# Patient Record
Sex: FEMALE | Race: WHITE | NOT HISPANIC OR LATINO
[De-identification: names, ages, dates, MRNs, and addresses within clinical notes are randomized per-mention and may not be internally consistent; named-entity substitution may affect disease eponyms.]

---

## 2017-11-07 ENCOUNTER — TRANSCRIPTION ENCOUNTER (OUTPATIENT)
Age: 27
End: 2017-11-07

## 2018-01-17 ENCOUNTER — RESULT REVIEW (OUTPATIENT)
Age: 28
End: 2018-01-17

## 2018-04-25 ENCOUNTER — ASOB RESULT (OUTPATIENT)
Age: 28
End: 2018-04-25

## 2018-04-25 ENCOUNTER — APPOINTMENT (OUTPATIENT)
Dept: ANTEPARTUM | Facility: CLINIC | Age: 28
End: 2018-04-25
Payer: COMMERCIAL

## 2018-04-25 PROCEDURE — 76817 TRANSVAGINAL US OBSTETRIC: CPT

## 2018-04-25 PROCEDURE — 76805 OB US >/= 14 WKS SNGL FETUS: CPT

## 2018-08-26 ENCOUNTER — OUTPATIENT (OUTPATIENT)
Dept: OUTPATIENT SERVICES | Facility: HOSPITAL | Age: 28
LOS: 1 days | End: 2018-08-26
Payer: COMMERCIAL

## 2018-08-26 DIAGNOSIS — O26.899 OTHER SPECIFIED PREGNANCY RELATED CONDITIONS, UNSPECIFIED TRIMESTER: ICD-10-CM

## 2018-08-26 DIAGNOSIS — Z3A.00 WEEKS OF GESTATION OF PREGNANCY NOT SPECIFIED: ICD-10-CM

## 2018-08-26 PROCEDURE — G0463: CPT

## 2018-08-26 PROCEDURE — 59025 FETAL NON-STRESS TEST: CPT

## 2018-08-30 ENCOUNTER — TRANSCRIPTION ENCOUNTER (OUTPATIENT)
Age: 28
End: 2018-08-30

## 2018-08-30 ENCOUNTER — INPATIENT (INPATIENT)
Facility: HOSPITAL | Age: 28
LOS: 0 days | Discharge: HOME | End: 2018-08-31
Attending: OBSTETRICS & GYNECOLOGY | Admitting: OBSTETRICS & GYNECOLOGY
Payer: COMMERCIAL

## 2018-08-30 VITALS — WEIGHT: 153 LBS | HEIGHT: 66 IN

## 2018-08-30 DIAGNOSIS — Z3A.00 WEEKS OF GESTATION OF PREGNANCY NOT SPECIFIED: ICD-10-CM

## 2018-08-30 DIAGNOSIS — O26.899 OTHER SPECIFIED PREGNANCY RELATED CONDITIONS, UNSPECIFIED TRIMESTER: ICD-10-CM

## 2018-08-30 DIAGNOSIS — Z34.80 ENCOUNTER FOR SUPERVISION OF OTHER NORMAL PREGNANCY, UNSPECIFIED TRIMESTER: ICD-10-CM

## 2018-08-30 LAB
ANTIBODY ID 1_1: SIGNIFICANT CHANGE UP
BASOPHILS # BLD AUTO: 0 K/UL — SIGNIFICANT CHANGE UP (ref 0–0.2)
BASOPHILS NFR BLD AUTO: 0.6 % — SIGNIFICANT CHANGE UP (ref 0–2)
BLD GP AB SCN SERPL QL: POSITIVE — SIGNIFICANT CHANGE UP
EOSINOPHIL # BLD AUTO: 0.1 K/UL — SIGNIFICANT CHANGE UP (ref 0–0.5)
EOSINOPHIL NFR BLD AUTO: 1.9 % — SIGNIFICANT CHANGE UP (ref 0–6)
HCT VFR BLD CALC: 40.4 % — SIGNIFICANT CHANGE UP (ref 34.5–45)
HGB BLD-MCNC: 13.4 G/DL — SIGNIFICANT CHANGE UP (ref 11.5–15.5)
LYMPHOCYTES # BLD AUTO: 1.9 K/UL — SIGNIFICANT CHANGE UP (ref 1–3.3)
LYMPHOCYTES # BLD AUTO: 27.9 % — SIGNIFICANT CHANGE UP (ref 13–44)
MCHC RBC-ENTMCNC: 31.6 PG — SIGNIFICANT CHANGE UP (ref 27–34)
MCHC RBC-ENTMCNC: 33.1 GM/DL — SIGNIFICANT CHANGE UP (ref 32–36)
MCV RBC AUTO: 95.4 FL — SIGNIFICANT CHANGE UP (ref 80–100)
MONOCYTES # BLD AUTO: 0.7 K/UL — SIGNIFICANT CHANGE UP (ref 0–0.9)
MONOCYTES NFR BLD AUTO: 9.4 % — SIGNIFICANT CHANGE UP (ref 2–14)
NEUTROPHILS # BLD AUTO: 4.2 K/UL — SIGNIFICANT CHANGE UP (ref 1.8–7.4)
NEUTROPHILS NFR BLD AUTO: 60.2 % — SIGNIFICANT CHANGE UP (ref 43–77)
PLATELET # BLD AUTO: 166 K/UL — SIGNIFICANT CHANGE UP (ref 150–400)
RBC # BLD: 4.23 M/UL — SIGNIFICANT CHANGE UP (ref 3.8–5.2)
RBC # FLD: 11.8 % — SIGNIFICANT CHANGE UP (ref 10.3–14.5)
RH IG SCN BLD-IMP: NEGATIVE — SIGNIFICANT CHANGE UP
T PALLIDUM AB TITR SER: NEGATIVE — SIGNIFICANT CHANGE UP
WBC # BLD: 7 K/UL — SIGNIFICANT CHANGE UP (ref 3.8–10.5)
WBC # FLD AUTO: 7 K/UL — SIGNIFICANT CHANGE UP (ref 3.8–10.5)

## 2018-08-30 PROCEDURE — 86077 PHYS BLOOD BANK SERV XMATCH: CPT

## 2018-08-30 RX ORDER — MAGNESIUM HYDROXIDE 400 MG/1
30 TABLET, CHEWABLE ORAL
Qty: 0 | Refills: 0 | Status: DISCONTINUED | OUTPATIENT
Start: 2018-08-30 | End: 2018-08-31

## 2018-08-30 RX ORDER — SODIUM CHLORIDE 9 MG/ML
3 INJECTION INTRAMUSCULAR; INTRAVENOUS; SUBCUTANEOUS EVERY 8 HOURS
Qty: 0 | Refills: 0 | Status: DISCONTINUED | OUTPATIENT
Start: 2018-08-30 | End: 2018-08-30

## 2018-08-30 RX ORDER — OXYCODONE HYDROCHLORIDE 5 MG/1
5 TABLET ORAL EVERY 4 HOURS
Qty: 0 | Refills: 0 | Status: DISCONTINUED | OUTPATIENT
Start: 2018-08-30 | End: 2018-08-31

## 2018-08-30 RX ORDER — OXYTOCIN 10 UNIT/ML
41.67 VIAL (ML) INJECTION
Qty: 20 | Refills: 0 | Status: DISCONTINUED | OUTPATIENT
Start: 2018-08-30 | End: 2018-08-31

## 2018-08-30 RX ORDER — SODIUM CHLORIDE 9 MG/ML
500 INJECTION, SOLUTION INTRAVENOUS ONCE
Qty: 0 | Refills: 0 | Status: DISCONTINUED | OUTPATIENT
Start: 2018-08-30 | End: 2018-08-30

## 2018-08-30 RX ORDER — OXYCODONE HYDROCHLORIDE 5 MG/1
5 TABLET ORAL
Qty: 0 | Refills: 0 | Status: DISCONTINUED | OUTPATIENT
Start: 2018-08-30 | End: 2018-08-31

## 2018-08-30 RX ORDER — OXYTOCIN 10 UNIT/ML
41.67 VIAL (ML) INJECTION
Qty: 20 | Refills: 0 | Status: DISCONTINUED | OUTPATIENT
Start: 2018-08-30 | End: 2018-08-30

## 2018-08-30 RX ORDER — SIMETHICONE 80 MG/1
80 TABLET, CHEWABLE ORAL EVERY 6 HOURS
Qty: 0 | Refills: 0 | Status: DISCONTINUED | OUTPATIENT
Start: 2018-08-30 | End: 2018-08-31

## 2018-08-30 RX ORDER — PRAMOXINE HYDROCHLORIDE 150 MG/15G
1 AEROSOL, FOAM RECTAL EVERY 4 HOURS
Qty: 0 | Refills: 0 | Status: DISCONTINUED | OUTPATIENT
Start: 2018-08-30 | End: 2018-08-30

## 2018-08-30 RX ORDER — OXYTOCIN 10 UNIT/ML
333.33 VIAL (ML) INJECTION
Qty: 20 | Refills: 0 | Status: COMPLETED | OUTPATIENT
Start: 2018-08-30

## 2018-08-30 RX ORDER — OXYTOCIN 10 UNIT/ML
333.33 VIAL (ML) INJECTION
Qty: 20 | Refills: 0 | Status: COMPLETED | OUTPATIENT
Start: 2018-08-30 | End: 2018-08-30

## 2018-08-30 RX ORDER — DIBUCAINE 1 %
1 OINTMENT (GRAM) RECTAL EVERY 4 HOURS
Qty: 0 | Refills: 0 | Status: DISCONTINUED | OUTPATIENT
Start: 2018-08-30 | End: 2018-08-30

## 2018-08-30 RX ORDER — DIPHENHYDRAMINE HCL 50 MG
25 CAPSULE ORAL EVERY 6 HOURS
Qty: 0 | Refills: 0 | Status: DISCONTINUED | OUTPATIENT
Start: 2018-08-30 | End: 2018-08-31

## 2018-08-30 RX ORDER — KETOROLAC TROMETHAMINE 30 MG/ML
30 SYRINGE (ML) INJECTION ONCE
Qty: 0 | Refills: 0 | Status: DISCONTINUED | OUTPATIENT
Start: 2018-08-30 | End: 2018-08-30

## 2018-08-30 RX ORDER — GLYCERIN ADULT
1 SUPPOSITORY, RECTAL RECTAL AT BEDTIME
Qty: 0 | Refills: 0 | Status: DISCONTINUED | OUTPATIENT
Start: 2018-08-30 | End: 2018-08-31

## 2018-08-30 RX ORDER — ACETAMINOPHEN 500 MG
975 TABLET ORAL EVERY 6 HOURS
Qty: 0 | Refills: 0 | Status: COMPLETED | OUTPATIENT
Start: 2018-08-30 | End: 2019-07-29

## 2018-08-30 RX ORDER — ACETAMINOPHEN 500 MG
975 TABLET ORAL EVERY 6 HOURS
Qty: 0 | Refills: 0 | Status: DISCONTINUED | OUTPATIENT
Start: 2018-08-30 | End: 2018-08-31

## 2018-08-30 RX ORDER — AER TRAVELER 0.5 G/1
1 SOLUTION RECTAL; TOPICAL EVERY 4 HOURS
Qty: 0 | Refills: 0 | Status: DISCONTINUED | OUTPATIENT
Start: 2018-08-30 | End: 2018-08-31

## 2018-08-30 RX ORDER — LANOLIN
1 OINTMENT (GRAM) TOPICAL EVERY 6 HOURS
Qty: 0 | Refills: 0 | Status: DISCONTINUED | OUTPATIENT
Start: 2018-08-30 | End: 2018-08-31

## 2018-08-30 RX ORDER — DIBUCAINE 1 %
1 OINTMENT (GRAM) RECTAL EVERY 4 HOURS
Qty: 0 | Refills: 0 | Status: DISCONTINUED | OUTPATIENT
Start: 2018-08-30 | End: 2018-08-31

## 2018-08-30 RX ORDER — SODIUM CHLORIDE 9 MG/ML
3 INJECTION INTRAMUSCULAR; INTRAVENOUS; SUBCUTANEOUS EVERY 8 HOURS
Qty: 0 | Refills: 0 | Status: DISCONTINUED | OUTPATIENT
Start: 2018-08-30 | End: 2018-08-31

## 2018-08-30 RX ORDER — PRAMOXINE HYDROCHLORIDE 150 MG/15G
1 AEROSOL, FOAM RECTAL EVERY 4 HOURS
Qty: 0 | Refills: 0 | Status: DISCONTINUED | OUTPATIENT
Start: 2018-08-30 | End: 2018-08-31

## 2018-08-30 RX ORDER — IBUPROFEN 200 MG
600 TABLET ORAL EVERY 6 HOURS
Qty: 0 | Refills: 0 | Status: COMPLETED | OUTPATIENT
Start: 2018-08-30 | End: 2019-07-29

## 2018-08-30 RX ORDER — DOCUSATE SODIUM 100 MG
100 CAPSULE ORAL
Qty: 0 | Refills: 0 | Status: DISCONTINUED | OUTPATIENT
Start: 2018-08-30 | End: 2018-08-31

## 2018-08-30 RX ORDER — SODIUM CHLORIDE 9 MG/ML
1000 INJECTION, SOLUTION INTRAVENOUS
Qty: 0 | Refills: 0 | Status: DISCONTINUED | OUTPATIENT
Start: 2018-08-30 | End: 2018-08-30

## 2018-08-30 RX ORDER — HYDROCORTISONE 1 %
1 OINTMENT (GRAM) TOPICAL EVERY 4 HOURS
Qty: 0 | Refills: 0 | Status: DISCONTINUED | OUTPATIENT
Start: 2018-08-30 | End: 2018-08-31

## 2018-08-30 RX ORDER — AER TRAVELER 0.5 G/1
1 SOLUTION RECTAL; TOPICAL EVERY 4 HOURS
Qty: 0 | Refills: 0 | Status: DISCONTINUED | OUTPATIENT
Start: 2018-08-30 | End: 2018-08-30

## 2018-08-30 RX ORDER — TETANUS TOXOID, REDUCED DIPHTHERIA TOXOID AND ACELLULAR PERTUSSIS VACCINE, ADSORBED 5; 2.5; 8; 8; 2.5 [IU]/.5ML; [IU]/.5ML; UG/.5ML; UG/.5ML; UG/.5ML
0.5 SUSPENSION INTRAMUSCULAR ONCE
Qty: 0 | Refills: 0 | Status: DISCONTINUED | OUTPATIENT
Start: 2018-08-30 | End: 2018-08-31

## 2018-08-30 RX ORDER — HYDROCORTISONE 1 %
1 OINTMENT (GRAM) TOPICAL EVERY 4 HOURS
Qty: 0 | Refills: 0 | Status: DISCONTINUED | OUTPATIENT
Start: 2018-08-30 | End: 2018-08-30

## 2018-08-30 RX ORDER — IBUPROFEN 200 MG
600 TABLET ORAL EVERY 6 HOURS
Qty: 0 | Refills: 0 | Status: DISCONTINUED | OUTPATIENT
Start: 2018-08-30 | End: 2018-08-31

## 2018-08-30 RX ORDER — CITRIC ACID/SODIUM CITRATE 300-500 MG
15 SOLUTION, ORAL ORAL EVERY 4 HOURS
Qty: 0 | Refills: 0 | Status: DISCONTINUED | OUTPATIENT
Start: 2018-08-30 | End: 2018-08-30

## 2018-08-30 RX ADMIN — SODIUM CHLORIDE 3 MILLILITER(S): 9 INJECTION INTRAMUSCULAR; INTRAVENOUS; SUBCUTANEOUS at 22:00

## 2018-08-30 RX ADMIN — Medication 125 MILLIUNIT(S)/MIN: at 07:02

## 2018-08-30 RX ADMIN — Medication 30 MILLIGRAM(S): at 07:00

## 2018-08-30 RX ADMIN — Medication 1000 MILLIUNIT(S)/MIN: at 07:02

## 2018-08-30 NOTE — DISCHARGE NOTE OB - PATIENT PORTAL LINK FT
You can access the [facility] Patient [facility], offered by [facility], by registering with the following website: [email or web address]

## 2018-08-30 NOTE — DISCHARGE NOTE OB - CARE PROVIDER_API CALL
Meghan Che), Obstetrics and Gynecology  [address]  [address]  [place] [address]  Phone: [phone]  Fax: [phone]

## 2018-08-31 VITALS
SYSTOLIC BLOOD PRESSURE: 103 MMHG | HEART RATE: 71 BPM | TEMPERATURE: 98 F | OXYGEN SATURATION: 98 % | RESPIRATION RATE: 17 BRPM | DIASTOLIC BLOOD PRESSURE: 68 MMHG

## 2018-08-31 LAB
HCT VFR BLD CALC: 41.5 % — SIGNIFICANT CHANGE UP (ref 34.5–45)
HGB BLD-MCNC: 13.5 G/DL — SIGNIFICANT CHANGE UP (ref 11.5–15.5)
KLEIHAUER-BETKE CALCULATION: 0 % — SIGNIFICANT CHANGE UP (ref 0–0.3)

## 2018-08-31 PROCEDURE — 86870 RBC ANTIBODY IDENTIFICATION: CPT

## 2018-08-31 PROCEDURE — 59050 FETAL MONITOR W/REPORT: CPT

## 2018-08-31 PROCEDURE — 86850 RBC ANTIBODY SCREEN: CPT

## 2018-08-31 PROCEDURE — 85014 HEMATOCRIT: CPT

## 2018-08-31 PROCEDURE — 85027 COMPLETE CBC AUTOMATED: CPT

## 2018-08-31 PROCEDURE — G0463: CPT

## 2018-08-31 PROCEDURE — 59025 FETAL NON-STRESS TEST: CPT

## 2018-08-31 PROCEDURE — 86780 TREPONEMA PALLIDUM: CPT

## 2018-08-31 PROCEDURE — 85018 HEMOGLOBIN: CPT

## 2018-08-31 PROCEDURE — 86900 BLOOD TYPING SEROLOGIC ABO: CPT

## 2018-08-31 PROCEDURE — 86901 BLOOD TYPING SEROLOGIC RH(D): CPT

## 2018-08-31 PROCEDURE — 85460 HEMOGLOBIN FETAL: CPT

## 2018-08-31 NOTE — PROGRESS NOTE ADULT - PROBLEM SELECTOR PLAN 1
- Pain well controlled, continue current pain regimen  - Increase ambulation  - Continue regular diet  - Discharge planning. Pt would like to be home to observe the Marcio

## 2018-08-31 NOTE — PROGRESS NOTE ADULT - SUBJECTIVE AND OBJECTIVE BOX
OB Postpartum Note - Vaginal Delivery  Patient is 28y s/p  PP day 1    Subjective:  Patient seen and examined at bedside. No acute overnight events. No acute complaints, pain well controlled. Epidural still in place. Anesthesia to remove this AM. Patient is ambulating, voiding spontaneously, passing gas, and tolerating regular diet. Patient denies SOB, CP, N/V, leg pain.     Objective:  Vital Signs Last 24 Hours  T(C): 36.8 (18 @ 05:37), Max: 37.1 (18 @ 13:31)  HR: 61 (18 @ 05:37) (56 - 96)  BP: 99/62 (18 @ 05:37) (90/60 - 101/66)  RR: 18 (18 @ 05:37) (16 - 18)  SpO2: 97% (18 @ 05:37) (95% - 97%)    Physical Exam:  General: NAD  Pulm: Clear to auscultation bilaterally  Cardio: Normal rate and regular rhythm  Abdomen: Soft, non-tender, non-distended, firm uterine fundus   Pelvic: Lochia wnl  Ext: No edema, No erythema, Non-tender    Labs:  Blood Type: O Negative  Antibody Screen: Positive  RPR: Negative                            13.4   7.0   )-----------( 166      ( 30 Aug 2018 04:36 )             40.4         MEDICATIONS  (STANDING):  acetaminophen   Tablet. 975 milliGRAM(s) Oral every 6 hours  diphtheria/tetanus/pertussis (acellular) Vaccine (ADAcel) 0.5 milliLiter(s) IntraMuscular once  ibuprofen  Tablet 600 milliGRAM(s) Oral every 6 hours  oxyCODONE    IR 5 milliGRAM(s) Oral every 3 hours  oxytocin Infusion 41.667 milliUNIT(s)/Min (125 mL/Hr) IV Continuous <Continuous>  prenatal multivitamin 1 Tablet(s) Oral daily  sodium chloride 0.9% lock flush 3 milliLiter(s) IV Push every 8 hours    MEDICATIONS  (PRN):  dibucaine 1% Ointment 1 Application(s) Topical every 4 hours PRN Perineal Discomfort  diphenhydrAMINE   Capsule 25 milliGRAM(s) Oral every 6 hours PRN Itching  docusate sodium 100 milliGRAM(s) Oral two times a day PRN Stool Softening  glycerin Suppository - Adult 1 Suppository(s) Rectal at bedtime PRN Constipation  hydrocortisone 1% Cream 1 Application(s) Topical every 4 hours PRN Moderate to Severe Perineal Pain  lanolin Ointment 1 Application(s) Topical every 6 hours PRN Sore Nipples  magnesium hydroxide Suspension 30 milliLiter(s) Oral two times a day PRN Constipation  oxyCODONE    IR 5 milliGRAM(s) Oral every 4 hours PRN Severe Pain (7 -10)  pramoxine 1%/zinc 5% Cream 1 Application(s) Topical every 4 hours PRN Moderate to Severe Perineal Pain  simethicone 80 milliGRAM(s) Chew every 6 hours PRN Gas  witch hazel Pads 1 Application(s) Topical every 4 hours PRN Perineal Discomfort    Vonnie Baumann, PGY-1
Vital Signs Last 24 Hrs  T(C): 37 (30 Aug 2018 17:49), Max: 37.1 (30 Aug 2018 13:31)  T(F): 98.6 (30 Aug 2018 17:49), Max: 98.8 (30 Aug 2018 13:31)  HR: 67 (30 Aug 2018 17:49) (56 - 96)  BP: 101/64 (30 Aug 2018 17:49) (90/60 - 109/72)  BP(mean): --  RR: 18 (30 Aug 2018 17:49) (16 - 19)  SpO2: 97% (30 Aug 2018 13:31) (95% - 100%)    Abdomen soft  Fundus Firm  Lochia WNL  Voiding  Stable

## 2018-11-13 ENCOUNTER — TRANSCRIPTION ENCOUNTER (OUTPATIENT)
Age: 28
End: 2018-11-13

## 2019-04-18 NOTE — PATIENT PROFILE OB - FUNCTIONAL SCREEN CURRENT LEVEL: DRESSING, MLM
"Subjective:       Patient ID: Brigette Dunn is a 49 y.o. female.    Vitals:  height is 5' 4" (1.626 m) and weight is 59.9 kg (132 lb). Her oral temperature is 98 °F (36.7 °C). Her blood pressure is 123/80 and her pulse is 93. Her oxygen saturation is 100%.     Chief Complaint: Urinary Tract Infection    Urinary Tract Infection    This is a new problem. The current episode started in the past 7 days (3 days). The problem has been unchanged. The quality of the pain is described as burning. The pain is at a severity of 6/10. The pain is moderate. There has been no fever. The fever has been present for less than 1 day. She is sexually active. There is no history of pyelonephritis. Associated symptoms include frequency and urgency. Pertinent negatives include no chills, hematuria, nausea, vomiting or rash. She has tried nothing for the symptoms.       Constitution: Negative for chills and fever.   Neck: Negative for painful lymph nodes.   Gastrointestinal: Negative for abdominal pain, nausea and vomiting.   Genitourinary: Positive for dysuria, frequency and urgency. Negative for urine decreased, hematuria, history of kidney stones, painful menstruation, irregular menstruation, missed menses, heavy menstrual bleeding, ovarian cysts, genital trauma, vaginal pain, vaginal discharge, vaginal bleeding, vaginal odor, painful intercourse, genital sore, painful ejaculation and pelvic pain.   Musculoskeletal: Negative for back pain.   Skin: Negative for rash and lesion.   Hematologic/Lymphatic: Negative for swollen lymph nodes.       Objective:      Physical Exam   Constitutional: She appears well-developed and well-nourished.   Eyes: Pupils are equal, round, and reactive to light. Conjunctivae and EOM are normal.   Abdominal: Soft. Bowel sounds are normal.   Nursing note and vitals reviewed.      Assessment:       1. Dysuria    2. Acute UTI        Plan:         Dysuria  -     POCT Urinalysis, Dipstick, Manual, w/o Scope    Acute " UTI  -     nitrofurantoin, macrocrystal-monohydrate, (MACROBID) 100 MG capsule; Take 1 capsule (100 mg total) by mouth 2 (two) times daily. for 7 days  Dispense: 14 capsule; Refill: 0  -     Culture, Urine          (0) independent

## 2019-09-23 ENCOUNTER — TRANSCRIPTION ENCOUNTER (OUTPATIENT)
Age: 29
End: 2019-09-23

## 2019-10-02 ENCOUNTER — TRANSCRIPTION ENCOUNTER (OUTPATIENT)
Age: 29
End: 2019-10-02

## 2019-12-10 ENCOUNTER — RESULT REVIEW (OUTPATIENT)
Age: 29
End: 2019-12-10

## 2020-02-25 ENCOUNTER — APPOINTMENT (OUTPATIENT)
Dept: ANTEPARTUM | Facility: CLINIC | Age: 30
End: 2020-02-25
Payer: COMMERCIAL

## 2020-02-25 ENCOUNTER — ASOB RESULT (OUTPATIENT)
Age: 30
End: 2020-02-25

## 2020-02-25 PROCEDURE — 76805 OB US >/= 14 WKS SNGL FETUS: CPT

## 2020-07-16 ENCOUNTER — INPATIENT (INPATIENT)
Facility: HOSPITAL | Age: 30
LOS: 1 days | Discharge: HOME | End: 2020-07-18
Attending: OBSTETRICS & GYNECOLOGY | Admitting: OBSTETRICS & GYNECOLOGY
Payer: COMMERCIAL

## 2020-07-16 VITALS
DIASTOLIC BLOOD PRESSURE: 75 MMHG | OXYGEN SATURATION: 98 % | SYSTOLIC BLOOD PRESSURE: 111 MMHG | HEART RATE: 65 BPM | RESPIRATION RATE: 17 BRPM | TEMPERATURE: 98 F

## 2020-07-16 DIAGNOSIS — Z3A.00 WEEKS OF GESTATION OF PREGNANCY NOT SPECIFIED: ICD-10-CM

## 2020-07-16 DIAGNOSIS — Z34.80 ENCOUNTER FOR SUPERVISION OF OTHER NORMAL PREGNANCY, UNSPECIFIED TRIMESTER: ICD-10-CM

## 2020-07-16 DIAGNOSIS — O26.899 OTHER SPECIFIED PREGNANCY RELATED CONDITIONS, UNSPECIFIED TRIMESTER: ICD-10-CM

## 2020-07-16 LAB
ANTIBODY ID 1_1: SIGNIFICANT CHANGE UP
BASOPHILS # BLD AUTO: 0.02 K/UL — SIGNIFICANT CHANGE UP (ref 0–0.2)
BASOPHILS NFR BLD AUTO: 0.3 % — SIGNIFICANT CHANGE UP (ref 0–2)
BLD GP AB SCN SERPL QL: POSITIVE — SIGNIFICANT CHANGE UP
EOSINOPHIL # BLD AUTO: 0.14 K/UL — SIGNIFICANT CHANGE UP (ref 0–0.5)
EOSINOPHIL NFR BLD AUTO: 2 % — SIGNIFICANT CHANGE UP (ref 0–6)
HCT VFR BLD CALC: 44 % — SIGNIFICANT CHANGE UP (ref 34.5–45)
HGB BLD-MCNC: 14.2 G/DL — SIGNIFICANT CHANGE UP (ref 11.5–15.5)
IMM GRANULOCYTES NFR BLD AUTO: 0.4 % — SIGNIFICANT CHANGE UP (ref 0–1.5)
LYMPHOCYTES # BLD AUTO: 1.6 K/UL — SIGNIFICANT CHANGE UP (ref 1–3.3)
LYMPHOCYTES # BLD AUTO: 22.7 % — SIGNIFICANT CHANGE UP (ref 13–44)
MCHC RBC-ENTMCNC: 31.4 PG — SIGNIFICANT CHANGE UP (ref 27–34)
MCHC RBC-ENTMCNC: 32.3 GM/DL — SIGNIFICANT CHANGE UP (ref 32–36)
MCV RBC AUTO: 97.3 FL — SIGNIFICANT CHANGE UP (ref 80–100)
MONOCYTES # BLD AUTO: 0.5 K/UL — SIGNIFICANT CHANGE UP (ref 0–0.9)
MONOCYTES NFR BLD AUTO: 7.1 % — SIGNIFICANT CHANGE UP (ref 2–14)
NEUTROPHILS # BLD AUTO: 4.75 K/UL — SIGNIFICANT CHANGE UP (ref 1.8–7.4)
NEUTROPHILS NFR BLD AUTO: 67.5 % — SIGNIFICANT CHANGE UP (ref 43–77)
NRBC # BLD: 0 /100 WBCS — SIGNIFICANT CHANGE UP (ref 0–0)
PLATELET # BLD AUTO: 165 K/UL — SIGNIFICANT CHANGE UP (ref 150–400)
RBC # BLD: 4.52 M/UL — SIGNIFICANT CHANGE UP (ref 3.8–5.2)
RBC # FLD: 12.8 % — SIGNIFICANT CHANGE UP (ref 10.3–14.5)
RH IG SCN BLD-IMP: NEGATIVE — SIGNIFICANT CHANGE UP
SARS-COV-2 RNA SPEC QL NAA+PROBE: SIGNIFICANT CHANGE UP
WBC # BLD: 7.04 K/UL — SIGNIFICANT CHANGE UP (ref 3.8–10.5)
WBC # FLD AUTO: 7.04 K/UL — SIGNIFICANT CHANGE UP (ref 3.8–10.5)

## 2020-07-16 PROCEDURE — 86077 PHYS BLOOD BANK SERV XMATCH: CPT

## 2020-07-16 RX ORDER — PRAMOXINE HYDROCHLORIDE 150 MG/15G
1 AEROSOL, FOAM RECTAL EVERY 4 HOURS
Refills: 0 | Status: DISCONTINUED | OUTPATIENT
Start: 2020-07-16 | End: 2020-07-18

## 2020-07-16 RX ORDER — CITRIC ACID/SODIUM CITRATE 300-500 MG
15 SOLUTION, ORAL ORAL EVERY 6 HOURS
Refills: 0 | Status: DISCONTINUED | OUTPATIENT
Start: 2020-07-16 | End: 2020-07-17

## 2020-07-16 RX ORDER — DIBUCAINE 1 %
1 OINTMENT (GRAM) RECTAL EVERY 6 HOURS
Refills: 0 | Status: DISCONTINUED | OUTPATIENT
Start: 2020-07-16 | End: 2020-07-18

## 2020-07-16 RX ORDER — IBUPROFEN 200 MG
600 TABLET ORAL EVERY 6 HOURS
Refills: 0 | Status: COMPLETED | OUTPATIENT
Start: 2020-07-16 | End: 2021-06-14

## 2020-07-16 RX ORDER — SODIUM CHLORIDE 9 MG/ML
1000 INJECTION, SOLUTION INTRAVENOUS
Refills: 0 | Status: DISCONTINUED | OUTPATIENT
Start: 2020-07-16 | End: 2020-07-17

## 2020-07-16 RX ORDER — LANOLIN
1 OINTMENT (GRAM) TOPICAL EVERY 6 HOURS
Refills: 0 | Status: DISCONTINUED | OUTPATIENT
Start: 2020-07-16 | End: 2020-07-18

## 2020-07-16 RX ORDER — SIMETHICONE 80 MG/1
80 TABLET, CHEWABLE ORAL EVERY 4 HOURS
Refills: 0 | Status: DISCONTINUED | OUTPATIENT
Start: 2020-07-16 | End: 2020-07-18

## 2020-07-16 RX ORDER — TETANUS TOXOID, REDUCED DIPHTHERIA TOXOID AND ACELLULAR PERTUSSIS VACCINE, ADSORBED 5; 2.5; 8; 8; 2.5 [IU]/.5ML; [IU]/.5ML; UG/.5ML; UG/.5ML; UG/.5ML
0.5 SUSPENSION INTRAMUSCULAR ONCE
Refills: 0 | Status: DISCONTINUED | OUTPATIENT
Start: 2020-07-16 | End: 2020-07-18

## 2020-07-16 RX ORDER — OXYCODONE HYDROCHLORIDE 5 MG/1
5 TABLET ORAL
Refills: 0 | Status: DISCONTINUED | OUTPATIENT
Start: 2020-07-16 | End: 2020-07-18

## 2020-07-16 RX ORDER — AER TRAVELER 0.5 G/1
1 SOLUTION RECTAL; TOPICAL EVERY 4 HOURS
Refills: 0 | Status: DISCONTINUED | OUTPATIENT
Start: 2020-07-16 | End: 2020-07-18

## 2020-07-16 RX ORDER — MAGNESIUM HYDROXIDE 400 MG/1
30 TABLET, CHEWABLE ORAL
Refills: 0 | Status: DISCONTINUED | OUTPATIENT
Start: 2020-07-16 | End: 2020-07-18

## 2020-07-16 RX ORDER — OXYTOCIN 10 UNIT/ML
333.33 VIAL (ML) INJECTION
Qty: 20 | Refills: 0 | Status: DISCONTINUED | OUTPATIENT
Start: 2020-07-16 | End: 2020-07-18

## 2020-07-16 RX ORDER — ACETAMINOPHEN 500 MG
975 TABLET ORAL
Refills: 0 | Status: DISCONTINUED | OUTPATIENT
Start: 2020-07-16 | End: 2020-07-18

## 2020-07-16 RX ORDER — SODIUM CHLORIDE 9 MG/ML
3 INJECTION INTRAMUSCULAR; INTRAVENOUS; SUBCUTANEOUS EVERY 8 HOURS
Refills: 0 | Status: DISCONTINUED | OUTPATIENT
Start: 2020-07-16 | End: 2020-07-18

## 2020-07-16 RX ORDER — BENZOCAINE 10 %
1 GEL (GRAM) MUCOUS MEMBRANE EVERY 6 HOURS
Refills: 0 | Status: DISCONTINUED | OUTPATIENT
Start: 2020-07-16 | End: 2020-07-18

## 2020-07-16 RX ORDER — OXYTOCIN 10 UNIT/ML
4 VIAL (ML) INJECTION
Qty: 30 | Refills: 0 | Status: DISCONTINUED | OUTPATIENT
Start: 2020-07-16 | End: 2020-07-16

## 2020-07-16 RX ORDER — OXYTOCIN 10 UNIT/ML
4 VIAL (ML) INJECTION
Qty: 30 | Refills: 0 | Status: DISCONTINUED | OUTPATIENT
Start: 2020-07-16 | End: 2020-07-18

## 2020-07-16 RX ORDER — OXYCODONE HYDROCHLORIDE 5 MG/1
5 TABLET ORAL ONCE
Refills: 0 | Status: DISCONTINUED | OUTPATIENT
Start: 2020-07-16 | End: 2020-07-18

## 2020-07-16 RX ORDER — KETOROLAC TROMETHAMINE 30 MG/ML
30 SYRINGE (ML) INJECTION ONCE
Refills: 0 | Status: DISCONTINUED | OUTPATIENT
Start: 2020-07-16 | End: 2020-07-16

## 2020-07-16 RX ORDER — HYDROCORTISONE 1 %
1 OINTMENT (GRAM) TOPICAL EVERY 6 HOURS
Refills: 0 | Status: DISCONTINUED | OUTPATIENT
Start: 2020-07-16 | End: 2020-07-18

## 2020-07-16 RX ORDER — DIPHENHYDRAMINE HCL 50 MG
25 CAPSULE ORAL EVERY 6 HOURS
Refills: 0 | Status: DISCONTINUED | OUTPATIENT
Start: 2020-07-16 | End: 2020-07-18

## 2020-07-16 RX ADMIN — SODIUM CHLORIDE 125 MILLILITER(S): 9 INJECTION, SOLUTION INTRAVENOUS at 19:42

## 2020-07-16 RX ADMIN — SODIUM CHLORIDE 125 MILLILITER(S): 9 INJECTION, SOLUTION INTRAVENOUS at 19:43

## 2020-07-16 RX ADMIN — Medication 4 MILLIUNIT(S)/MIN: at 19:53

## 2020-07-16 RX ADMIN — Medication 30 MILLIGRAM(S): at 21:45

## 2020-07-16 RX ADMIN — Medication 975 MILLIGRAM(S): at 23:24

## 2020-07-17 ENCOUNTER — TRANSCRIPTION ENCOUNTER (OUTPATIENT)
Age: 30
End: 2020-07-17

## 2020-07-17 LAB
SARS-COV-2 IGG SERPL QL IA: NEGATIVE — SIGNIFICANT CHANGE UP
SARS-COV-2 IGM SERPL IA-ACNC: <0.1 INDEX — SIGNIFICANT CHANGE UP
T PALLIDUM AB TITR SER: NEGATIVE — SIGNIFICANT CHANGE UP

## 2020-07-17 RX ORDER — ACETAMINOPHEN 500 MG
2 TABLET ORAL
Qty: 0 | Refills: 0 | DISCHARGE
Start: 2020-07-17

## 2020-07-17 RX ORDER — IBUPROFEN 200 MG
600 TABLET ORAL EVERY 6 HOURS
Refills: 0 | Status: DISCONTINUED | OUTPATIENT
Start: 2020-07-17 | End: 2020-07-18

## 2020-07-17 RX ORDER — IBUPROFEN 200 MG
1 TABLET ORAL
Qty: 0 | Refills: 0 | DISCHARGE
Start: 2020-07-17

## 2020-07-17 RX ADMIN — Medication 600 MILLIGRAM(S): at 14:36

## 2020-07-17 RX ADMIN — Medication 1 TABLET(S): at 13:13

## 2020-07-17 RX ADMIN — Medication 975 MILLIGRAM(S): at 06:14

## 2020-07-17 RX ADMIN — Medication 600 MILLIGRAM(S): at 23:59

## 2020-07-17 NOTE — PROGRESS NOTE ADULT - SUBJECTIVE AND OBJECTIVE BOX
Postpartum Note- PPD#1    Allergies: No Known Allergies    RPR Negative  Blood Type  O  --  Negative /  Rh neg  Rubella immune      Patient w/o complaints, pain is controlled.  Pt is OOB, tolerating PO, passing flatus. Lochia WNL. breastfeeding. voiding freely    O:  Vital Signs Last 24 Hrs  T(C): 36.7 (2020 06:14), Max: 37.3 (2020 00:05)  T(F): 98 (2020 06:14), Max: 99.1 (2020 00:05)  HR: 59 (2020 06:14) (57 - 71)  BP: 93/60 (2020 06:14) (93/60 - 133/85)  BP(mean): --  RR: 18 (2020 06:14) (16 - 18)  SpO2: 99% (2020 06:14) (93% - 100%)     Gen: NAD  Heart: S1S2 RRR  Lungs: CTA b/l  Abdomen: Soft, nontender, non-distended, fundus firm.  Lochia WNL  Ext: Neg edema, Neg calf tenderness    LABS:               14.2   7.04  )-----------( 165      ( 07-16 @ 19:37 )             44.0

## 2020-07-17 NOTE — DISCHARGE NOTE OB - CARE PLAN
Principal Discharge DX:	 (normal spontaneous vaginal delivery)  Goal:	routine postpartum recovery  Assessment and plan of treatment:	Regular diet as tolerated, regular activity as tolerated, no heavy lifting for first six weeks.  Take tylenol, motrin, and oxycodone as needed for pain control.  Do not drive while taking oxycodone.  Nothing per vagina (ie no tampons, douching, or intercourse) until cleared by your doctor.  Shower only, no baths.  Please make an appointment to see your doctor in 2 weeks.  Call your doctor or go to the ED if you have bleeding that does not stop, are unable to urinate, or have a fever >100.4.

## 2020-07-17 NOTE — DISCHARGE NOTE OB - PLAN OF CARE
routine postpartum recovery Regular diet as tolerated, regular activity as tolerated, no heavy lifting for first six weeks.  Take tylenol, motrin, and oxycodone as needed for pain control.  Do not drive while taking oxycodone.  Nothing per vagina (ie no tampons, douching, or intercourse) until cleared by your doctor.  Shower only, no baths.  Please make an appointment to see your doctor in 2 weeks.  Call your doctor or go to the ED if you have bleeding that does not stop, are unable to urinate, or have a fever >100.4.

## 2020-07-17 NOTE — DISCHARGE NOTE OB - MATERIALS PROVIDED
Guide to Postpartum Care/Bottle Feeding Log/Vaccinations/[facility]  Screening Program/Back To Sleep Handout/Shaken Baby Prevention Handout/Breastfeeding Log/Breastfeeding Mother’s Support Group Information/[facility] Hearing Screen Program/Breastfeeding Guide and Packet/  Immunization Record

## 2020-07-17 NOTE — DISCHARGE NOTE OB - MEDICATION SUMMARY - MEDICATIONS TO TAKE
I will START or STAY ON the medications listed below when I get home from the hospital:    acetaminophen 500 mg oral tablet  -- 2 tab(s) by mouth every 6 hours  -- Indication: For pain    ibuprofen 600 mg oral tablet  -- 1 tab(s) by mouth every 6 hours  -- Indication: For pain

## 2020-07-17 NOTE — DISCHARGE NOTE OB - CARE PROVIDER_API CALL
Meghan Che  OBSTETRICS AND GYNECOLOGY  [address]  [address]  [place] [address]  Phone: [phone]  Fax: [phone]  Follow Up Time:

## 2020-07-17 NOTE — DISCHARGE NOTE OB - PATIENT PORTAL LINK FT
You can access the FollowMyHealth Patient Portal offered by [facility] by registering at the following website: [email or web address]. By joining [employer]’s FollowMyHealth portal, you will also be able to view your health information using other applications (apps) compatible with our system.

## 2020-07-18 VITALS
TEMPERATURE: 99 F | RESPIRATION RATE: 18 BRPM | DIASTOLIC BLOOD PRESSURE: 70 MMHG | OXYGEN SATURATION: 97 % | HEART RATE: 63 BPM | SYSTOLIC BLOOD PRESSURE: 101 MMHG

## 2020-07-18 PROCEDURE — 87635 SARS-COV-2 COVID-19 AMP PRB: CPT

## 2020-07-18 PROCEDURE — 86769 SARS-COV-2 COVID-19 ANTIBODY: CPT

## 2020-07-18 PROCEDURE — 86901 BLOOD TYPING SEROLOGIC RH(D): CPT

## 2020-07-18 PROCEDURE — 86900 BLOOD TYPING SEROLOGIC ABO: CPT

## 2020-07-18 PROCEDURE — 85027 COMPLETE CBC AUTOMATED: CPT

## 2020-07-18 PROCEDURE — G0463: CPT

## 2020-07-18 PROCEDURE — 59050 FETAL MONITOR W/REPORT: CPT

## 2020-07-18 PROCEDURE — 86780 TREPONEMA PALLIDUM: CPT

## 2020-07-18 PROCEDURE — 86850 RBC ANTIBODY SCREEN: CPT

## 2020-07-18 PROCEDURE — 59025 FETAL NON-STRESS TEST: CPT

## 2020-07-18 PROCEDURE — 86870 RBC ANTIBODY IDENTIFICATION: CPT

## 2020-07-18 RX ADMIN — Medication 600 MILLIGRAM(S): at 06:55

## 2020-07-26 ENCOUNTER — EMERGENCY (EMERGENCY)
Facility: HOSPITAL | Age: 30
LOS: 1 days | End: 2020-07-26
Attending: STUDENT IN AN ORGANIZED HEALTH CARE EDUCATION/TRAINING PROGRAM
Payer: COMMERCIAL

## 2020-07-26 VITALS
HEART RATE: 87 BPM | OXYGEN SATURATION: 99 % | SYSTOLIC BLOOD PRESSURE: 110 MMHG | RESPIRATION RATE: 18 BRPM | WEIGHT: 139.99 LBS | HEIGHT: 66 IN | TEMPERATURE: 99 F | DIASTOLIC BLOOD PRESSURE: 77 MMHG

## 2020-07-26 DIAGNOSIS — N93.9 ABNORMAL UTERINE AND VAGINAL BLEEDING, UNSPECIFIED: ICD-10-CM

## 2020-07-26 LAB
ALBUMIN SERPL ELPH-MCNC: 3.5 G/DL — SIGNIFICANT CHANGE UP (ref 3.3–5)
ALP SERPL-CCNC: 93 U/L — SIGNIFICANT CHANGE UP (ref 40–120)
ALT FLD-CCNC: 18 U/L — SIGNIFICANT CHANGE UP (ref 10–45)
ANION GAP SERPL CALC-SCNC: 11 MMOL/L — SIGNIFICANT CHANGE UP (ref 5–17)
ANTIBODY ID 1_1: SIGNIFICANT CHANGE UP
APTT BLD: 25.1 SEC — LOW (ref 27.5–35.5)
AST SERPL-CCNC: 21 U/L — SIGNIFICANT CHANGE UP (ref 10–40)
BASE EXCESS BLDV CALC-SCNC: 1.2 MMOL/L — SIGNIFICANT CHANGE UP (ref -2–2)
BASOPHILS # BLD AUTO: 0.07 K/UL — SIGNIFICANT CHANGE UP (ref 0–0.2)
BASOPHILS NFR BLD AUTO: 0.6 % — SIGNIFICANT CHANGE UP (ref 0–2)
BILIRUB SERPL-MCNC: 0.2 MG/DL — SIGNIFICANT CHANGE UP (ref 0.2–1.2)
BLD GP AB SCN SERPL QL: POSITIVE — SIGNIFICANT CHANGE UP
BUN SERPL-MCNC: 10 MG/DL — SIGNIFICANT CHANGE UP (ref 7–23)
CA-I SERPL-SCNC: 1.12 MMOL/L — SIGNIFICANT CHANGE UP (ref 1.12–1.3)
CALCIUM SERPL-MCNC: 8.7 MG/DL — SIGNIFICANT CHANGE UP (ref 8.4–10.5)
CHLORIDE BLDV-SCNC: 107 MMOL/L — SIGNIFICANT CHANGE UP (ref 96–108)
CHLORIDE SERPL-SCNC: 102 MMOL/L — SIGNIFICANT CHANGE UP (ref 96–108)
CO2 BLDV-SCNC: 27 MMOL/L — SIGNIFICANT CHANGE UP (ref 22–30)
CO2 SERPL-SCNC: 23 MMOL/L — SIGNIFICANT CHANGE UP (ref 22–31)
CREAT SERPL-MCNC: 0.55 MG/DL — SIGNIFICANT CHANGE UP (ref 0.5–1.3)
EOSINOPHIL # BLD AUTO: 0.27 K/UL — SIGNIFICANT CHANGE UP (ref 0–0.5)
EOSINOPHIL NFR BLD AUTO: 2.4 % — SIGNIFICANT CHANGE UP (ref 0–6)
GAS PNL BLDV: 132 MMOL/L — LOW (ref 135–145)
GAS PNL BLDV: SIGNIFICANT CHANGE UP
GLUCOSE BLDV-MCNC: 106 MG/DL — HIGH (ref 70–99)
GLUCOSE SERPL-MCNC: 108 MG/DL — HIGH (ref 70–99)
HCO3 BLDV-SCNC: 25 MMOL/L — SIGNIFICANT CHANGE UP (ref 21–29)
HCT VFR BLD CALC: 40.2 % — SIGNIFICANT CHANGE UP (ref 34.5–45)
HCT VFR BLDA CALC: 42 % — SIGNIFICANT CHANGE UP (ref 39–50)
HGB BLD CALC-MCNC: 13.7 G/DL — SIGNIFICANT CHANGE UP (ref 11.5–15.5)
HGB BLD-MCNC: 13 G/DL — SIGNIFICANT CHANGE UP (ref 11.5–15.5)
IMM GRANULOCYTES NFR BLD AUTO: 0.4 % — SIGNIFICANT CHANGE UP (ref 0–1.5)
INR BLD: 1 RATIO — SIGNIFICANT CHANGE UP (ref 0.88–1.16)
LACTATE BLDV-MCNC: 1.4 MMOL/L — SIGNIFICANT CHANGE UP (ref 0.7–2)
LYMPHOCYTES # BLD AUTO: 2.82 K/UL — SIGNIFICANT CHANGE UP (ref 1–3.3)
LYMPHOCYTES # BLD AUTO: 24.8 % — SIGNIFICANT CHANGE UP (ref 13–44)
MCHC RBC-ENTMCNC: 31.4 PG — SIGNIFICANT CHANGE UP (ref 27–34)
MCHC RBC-ENTMCNC: 32.3 GM/DL — SIGNIFICANT CHANGE UP (ref 32–36)
MCV RBC AUTO: 97.1 FL — SIGNIFICANT CHANGE UP (ref 80–100)
MONOCYTES # BLD AUTO: 0.78 K/UL — SIGNIFICANT CHANGE UP (ref 0–0.9)
MONOCYTES NFR BLD AUTO: 6.9 % — SIGNIFICANT CHANGE UP (ref 2–14)
NEUTROPHILS # BLD AUTO: 7.37 K/UL — SIGNIFICANT CHANGE UP (ref 1.8–7.4)
NEUTROPHILS NFR BLD AUTO: 64.9 % — SIGNIFICANT CHANGE UP (ref 43–77)
NRBC # BLD: 0 /100 WBCS — SIGNIFICANT CHANGE UP (ref 0–0)
PCO2 BLDV: 41 MMHG — SIGNIFICANT CHANGE UP (ref 35–50)
PH BLDV: 7.41 — SIGNIFICANT CHANGE UP (ref 7.35–7.45)
PLATELET # BLD AUTO: 315 K/UL — SIGNIFICANT CHANGE UP (ref 150–400)
PO2 BLDV: 49 MMHG — HIGH (ref 25–45)
POTASSIUM BLDV-SCNC: 3.7 MMOL/L — SIGNIFICANT CHANGE UP (ref 3.5–5.3)
POTASSIUM SERPL-MCNC: 3.9 MMOL/L — SIGNIFICANT CHANGE UP (ref 3.5–5.3)
POTASSIUM SERPL-SCNC: 3.9 MMOL/L — SIGNIFICANT CHANGE UP (ref 3.5–5.3)
PROT SERPL-MCNC: 6.3 G/DL — SIGNIFICANT CHANGE UP (ref 6–8.3)
PROTHROM AB SERPL-ACNC: 11.9 SEC — SIGNIFICANT CHANGE UP (ref 10.6–13.6)
RBC # BLD: 4.14 M/UL — SIGNIFICANT CHANGE UP (ref 3.8–5.2)
RBC # FLD: 11.9 % — SIGNIFICANT CHANGE UP (ref 10.3–14.5)
RH IG SCN BLD-IMP: NEGATIVE — SIGNIFICANT CHANGE UP
SAO2 % BLDV: 83 % — SIGNIFICANT CHANGE UP (ref 67–88)
SARS-COV-2 RNA SPEC QL NAA+PROBE: SIGNIFICANT CHANGE UP
SODIUM SERPL-SCNC: 136 MMOL/L — SIGNIFICANT CHANGE UP (ref 135–145)
WBC # BLD: 11.36 K/UL — HIGH (ref 3.8–10.5)
WBC # FLD AUTO: 11.36 K/UL — HIGH (ref 3.8–10.5)

## 2020-07-26 PROCEDURE — 86077 PHYS BLOOD BANK SERV XMATCH: CPT

## 2020-07-26 PROCEDURE — 99285 EMERGENCY DEPT VISIT HI MDM: CPT | Mod: CR

## 2020-07-26 PROCEDURE — 76830 TRANSVAGINAL US NON-OB: CPT | Mod: 26

## 2020-07-26 RX ORDER — SODIUM CHLORIDE 9 MG/ML
1000 INJECTION INTRAMUSCULAR; INTRAVENOUS; SUBCUTANEOUS ONCE
Refills: 0 | Status: COMPLETED | OUTPATIENT
Start: 2020-07-26 | End: 2020-07-26

## 2020-07-26 RX ORDER — OXYTOCIN 10 UNIT/ML
333.33 VIAL (ML) INJECTION
Qty: 20 | Refills: 0 | Status: DISCONTINUED | OUTPATIENT
Start: 2020-07-26 | End: 2020-07-30

## 2020-07-26 RX ADMIN — SODIUM CHLORIDE 1000 MILLILITER(S): 9 INJECTION INTRAMUSCULAR; INTRAVENOUS; SUBCUTANEOUS at 18:07

## 2020-07-26 RX ADMIN — SODIUM CHLORIDE 1000 MILLILITER(S): 9 INJECTION INTRAMUSCULAR; INTRAVENOUS; SUBCUTANEOUS at 19:40

## 2020-07-26 RX ADMIN — Medication 0.2 MILLIGRAM(S): at 19:57

## 2020-07-26 RX ADMIN — SODIUM CHLORIDE 1000 MILLILITER(S): 9 INJECTION INTRAMUSCULAR; INTRAVENOUS; SUBCUTANEOUS at 19:29

## 2020-07-26 RX ADMIN — Medication 1000 MILLIUNIT(S)/MIN: at 20:11

## 2020-07-26 NOTE — ED PROVIDER NOTE - PHYSICAL EXAMINATION
NAD, VSS, Afebrile, Neck supple. Lungs clear. ABD soft, non tender. No CVA tender. No focal neuro deficit.

## 2020-07-26 NOTE — CONSULT NOTE ADULT - ASSESSMENT
30yo PPD#10 s/p vaginal delivery with vaginal bleeding and suspected retained POC 28yo PPD#10 s/p uncomplicated vaginal delivery with vaginal bleeding 28yo PPD#10 s/p uncomplicated vaginal delivery with vaginal bleeding.

## 2020-07-26 NOTE — CONSULT NOTE ADULT - SUBJECTIVE AND OBJECTIVE BOX
GYN Consult Note    29y PD#10 s/p vaginal delivery presents with vaginal bleeding.  Patient states bleeding began at noon and has been 8 pads since.  Bleeding up to that point was 3 pads a day.  Patient endorses some lightheadedness.  Denies fevers/chills/nausea/vomiting/headache.      OB/GYN HISTORY:   G1:   G2:     Last Menstrual Period    Name of GYN Physician: Dr. Che       PAST MEDICAL & SURGICAL HISTORY:      REVIEW OF SYSTEMS  General: denies fevers, chills, tiredness  Skin/Breast: denies breast pain  Respiratory and Thorax: denies shortness of breath, denies cough  Cardiovascular: denies chest pain and denies palpitations  Gastrointestinal: denies abdominal pain, nausea/ vomiting	  Genitourinary: denies dysuria, increased urinary frequency, urgency	  Constitutional, Cardiovascular, Respiratory, Gastrointestinal, Genitourinary, Musculoskeletal and Integumentary review of systems are normal except as noted. 	    MEDICATIONS  (STANDING):  sodium chloride 0.9% Bolus 1000 milliLiter(s) IV Bolus once  sodium chloride 0.9% Bolus 1000 milliLiter(s) IV Bolus once    MEDICATIONS  (PRN):      Allergies    No Known Allergies    Intolerances        Vital Signs Last 24 Hrs  T(C): 36.6 (26 Jul 2020 17:48), Max: 37.3 (26 Jul 2020 17:04)  T(F): 97.9 (26 Jul 2020 17:48), Max: 99.1 (26 Jul 2020 17:04)  HR: 68 (26 Jul 2020 18:00) (68 - 87)  BP: 97/68 (26 Jul 2020 18:00) (88/61 - 110/77)  BP(mean): --  RR: 18 (26 Jul 2020 18:00) (18 - 18)  SpO2: 100% (26 Jul 2020 18:00) (98% - 100%)    PHYSICAL EXAM:   Gen: NAD, alert and oriented x 3  Abd: soft, non tender, non distended  Pelvic: blood clot in vaginal vault, 300cc blood clot and tissue removed upon insertion of speculum and with bimanual exam, cervix dilated 2-3cm  Adnexa: non tender, no palpable masses  Extremities: NTBL  Skin: warm GYN Consult Note    29y  PPD#10 s/p vaginal delivery presents with vaginal bleeding.  Patient states bleeding began at noon and has been 8 pads since.  Bleeding up to that point was 3 pads a day.  Patient endorses some lightheadedness.  Denies fevers/chills/nausea/vomiting/headache.      OB/GYN HISTORY:   P4: , , ,     Last Menstrual Period    Name of GYN Physician: Dr. Che       PAST MEDICAL & SURGICAL HISTORY:      REVIEW OF SYSTEMS  General: denies fevers, chills, tiredness  Skin/Breast: denies breast pain  Respiratory and Thorax: denies shortness of breath, denies cough  Cardiovascular: denies chest pain and denies palpitations  Gastrointestinal: denies abdominal pain, nausea/ vomiting	  Genitourinary: denies dysuria, increased urinary frequency, urgency	  Constitutional, Cardiovascular, Respiratory, Gastrointestinal, Genitourinary, Musculoskeletal and Integumentary review of systems are normal except as noted. 	    MEDICATIONS  (STANDING):  sodium chloride 0.9% Bolus 1000 milliLiter(s) IV Bolus once  sodium chloride 0.9% Bolus 1000 milliLiter(s) IV Bolus once    MEDICATIONS  (PRN):      Allergies    No Known Allergies    Intolerances        Vital Signs Last 24 Hrs  T(C): 36.6 (2020 17:48), Max: 37.3 (2020 17:04)  T(F): 97.9 (2020 17:48), Max: 99.1 (2020 17:04)  HR: 68 (2020 18:00) (68 - 87)  BP: 97/68 (2020 18:00) (88/61 - 110/77)  BP(mean): --  RR: 18 (2020 18:00) (18 - 18)  SpO2: 100% (2020 18:00) (98% - 100%)    PHYSICAL EXAM:   Gen: NAD, alert and oriented x 3  Abd: soft, non tender, non distended  Pelvic: blood clot in vaginal vault, 300cc blood clot removed upon insertion of speculum and with bimanual exam, cervix dilated 2-3cm  Adnexa: non tender, no palpable masses  Extremities: NTBL  Skin: warm

## 2020-07-26 NOTE — ED PROVIDER NOTE - PROGRESS NOTE DETAILS
AG attg: patient with active bleeding. OB at bedside. hemodynamically stable. Pt c/o feeling dizzy and faint suddenly. She is lying on a bed. No LOC. Pt stated feeling better after ice chips. vss. Pt stated feeling better after ice chips. vss. OB team at bedside. Dr. Che (attending) at bedside evaluating patient. HAs reviewed images and examing patient. States that exam is c/w uterine atony. No active bleeding. Feels improved. Not recommending additional radiology US at this time. Will continue to observe, recheck hgb at 6 hours and reassess. hemodynamically stable and well appearing. blood tx was initially ordered pending labs given reported active bleeding and dizziness, but cancelled now that no active bleeding, patient feels improved, hgb wnl. will reassess and reorder as needed. No active vaginal bleeding with pelvic exam at this time (chaperone with PCA. Nati), no visualized clots. Denies dizziness, CP/SOB/ABD pain or N/V/D. Patient spiked low grade fever s/p cytotec. suspected to be 2/2 medication. ordered to TVUS for increased sensitivity to r/o retained products given somewhat limited transabd exam. plan to cdu for rpt hgb at 6hr. patient remains well appearing and vss.

## 2020-07-26 NOTE — CONSULT NOTE ADULT - PROBLEM SELECTOR RECOMMENDATION 9
-patient ordered for TVUS  -stat CBC, coags, CMP, VBG, lactate  -will obtain bed side sono until labs back and patient deemed stable for sono  -consider D&C if retained products noted on ultrasound   -continue to monitor VS closely    Seen w/ Dr. Melchor, Dr. Che aware  Malcolm Morales, PGY2 -patient ordered for TVUS  -stat CBC, coags, CMP, VBG, lactate  -will obtain bed side sono until labs back  -patient for methergine, pitocin, cytotec  -continue to monitor VS closely    Seen w/ Dr. Melchor, Dr. Che aware  Malcolm Morales, PGY2 -patient ordered for TVUS  -stat CBC, coags, CMP, VBG, lactate  -will obtain bed side sono until labs back  -patient for methergine, pitocin, cytotec  -continue to monitor VS closely    Seen w/ Dr. Melchor, Dr. Che aware  Malcolm Morales, PGY2    Addendum:  BSS completed - endometrial stripe 11mm, no obvious vascularity noted (please see ED report)  H/H 13/40  Patient to receive metheringe, pitocin, cytotec  Products removed on vaginal exam to pathology    Dr. Che at bedside

## 2020-07-26 NOTE — ED ADULT NURSE NOTE - OBJECTIVE STATEMENT
29 year old female presenting to ED c/o vaginal bleeding. Pt A+Ox3, moves all four extremities, vaginal delivery x10 days ago with no complications, and baby is healthy. Pt reports gradual diminished vaginal bleeding since delivery, however sudden increase in bleeding since noon today. Pt states "blood was pouring out" when she stood up, and has been saturating about 2 pads Q1 hour. Upon ED arrival, pt denied headache, lightheadedness, dizziness, chest pain, SOB, N/V, abdominal pain, urinary or bowel symptoms, fevers or chills. About 30 minutes after ED arrival, pt reported lightheadedness, dizziness, and nausea, and appeared extremely pale with no color to lips. After fluids were began, pt regained color to face and lips, and stated improvement in dizziness/lightheadedness. US completed and OB team at bedside.

## 2020-07-26 NOTE — ED PROVIDER NOTE - ATTENDING CONTRIBUTION TO CARE
I performed a history and physical exam of the patient and discussed their management with the resident. I reviewed the resident's note and agree with the documented findings and plan of care. My medical decision making and observations are found above.    29F P4 10d s/p  p/w vag bleeding. Vag bleeding was improving since discharge but started abruptly again at noon and has been saturating one pad an hour. Upon ER arrival p/w dizziness at rest. no sob/cp, abd pain, urinary complaints. no other easy bleeding/bruising.    Gen: slightly pale and anxious appearing. AOx3  Head: NCAT  HEENT: +subconjunctival pallor. PERRL, oral mucosa moist, normal conjunctiva, oropharynx clear without exudate or erythema  Lung: CTAB, no respiratory distress, no wheezing, rales, rhonchi  CV: normal s1/s2, rrr, no murmurs, Normal perfusion, pulses 2+ throughout. mildly delayed cap refill  Abd: soft, NTND, no CVA tenderness  MSK: No edema, no visible deformities, full range of motion in all 4 extremities  Neuro: CN II-XII grossly intact, No focal neurologic deficits  Skin: No rash     Patient is hemodynamically stable, slightly pale with mildly delayed cap refill an active vag bleeding. Abd is unremarkable. Will call ob, close hemodynamic monitoring, labs, type, US once patient is improved/hgb resulted. I performed a history and physical exam of the patient and discussed their management with the NP. I reviewed the NP's note and agree with the documented findings and plan of care. My medical decision making and observations are found above.    29F P4 10d s/p  p/w vag bleeding. Vag bleeding was improving since discharge but started abruptly again at noon and has been saturating one pad an hour. Upon ER arrival p/w dizziness at rest. no sob/cp, abd pain, urinary complaints. no other easy bleeding/bruising.    Gen: slightly pale and anxious appearing. AOx3  Head: NCAT  HEENT: +subconjunctival pallor. PERRL, oral mucosa moist, normal conjunctiva, oropharynx clear without exudate or erythema  Lung: CTAB, no respiratory distress, no wheezing, rales, rhonchi  CV: normal s1/s2, rrr, no murmurs, Normal perfusion, pulses 2+ throughout. mildly delayed cap refill  Abd: soft, NTND, no CVA tenderness  MSK: No edema, no visible deformities, full range of motion in all 4 extremities  Neuro: CN II-XII grossly intact, No focal neurologic deficits  Skin: No rash     Patient is hemodynamically stable, slightly pale with mildly delayed cap refill an active vag bleeding. Abd is unremarkable. Will call ob, close hemodynamic monitoring, labs, type, US once patient is improved/hgb resulted.

## 2020-07-26 NOTE — ED PROVIDER NOTE - OBJECTIVE STATEMENT
30yo female pt was brought to ED c/o worsening vaginal bleeding s/p  on 20 by Dr. Che. Pt stated she's been fine since the delivery with gradual diminished vaginal bleeding but she noticed sudden worsening active vaginal bleeding at noon. She's been saturating one pad an hour. Denies 30yo female pt was brought to ED c/o worsening vaginal bleeding s/p  on 20 by Dr. Che. Pt stated she's been fine since the delivery with gradual diminished vaginal bleeding but she noticed sudden worsening active vaginal bleeding at noon. She's been saturating one pad an hour. Denies abd pain or N/V/D. Denies fever, chills, cough or congestion. Denies headache, dizziness, CP or SOB. Denies urinary problems. Denies sensory changes or weakness to extremities.

## 2020-07-27 VITALS
SYSTOLIC BLOOD PRESSURE: 97 MMHG | DIASTOLIC BLOOD PRESSURE: 66 MMHG | HEART RATE: 52 BPM | TEMPERATURE: 98 F | RESPIRATION RATE: 18 BRPM | OXYGEN SATURATION: 97 %

## 2020-07-27 LAB
APPEARANCE UR: ABNORMAL
BACTERIA # UR AUTO: NEGATIVE — SIGNIFICANT CHANGE UP
BASOPHILS # BLD AUTO: 0.05 K/UL — SIGNIFICANT CHANGE UP (ref 0–0.2)
BASOPHILS NFR BLD AUTO: 0.6 % — SIGNIFICANT CHANGE UP (ref 0–2)
BILIRUB UR-MCNC: NEGATIVE — SIGNIFICANT CHANGE UP
COLOR SPEC: ABNORMAL
DIFF PNL FLD: ABNORMAL
EOSINOPHIL # BLD AUTO: 0.28 K/UL — SIGNIFICANT CHANGE UP (ref 0–0.5)
EOSINOPHIL NFR BLD AUTO: 3.6 % — SIGNIFICANT CHANGE UP (ref 0–6)
EPI CELLS # UR: 1 /HPF — SIGNIFICANT CHANGE UP
GLUCOSE UR QL: NEGATIVE — SIGNIFICANT CHANGE UP
HCT VFR BLD CALC: 34.1 % — LOW (ref 34.5–45)
HCT VFR BLD CALC: 36 % — SIGNIFICANT CHANGE UP (ref 34.5–45)
HGB BLD-MCNC: 10.9 G/DL — LOW (ref 11.5–15.5)
HGB BLD-MCNC: 11.5 G/DL — SIGNIFICANT CHANGE UP (ref 11.5–15.5)
HYALINE CASTS # UR AUTO: 0 /LPF — SIGNIFICANT CHANGE UP (ref 0–7)
IMM GRANULOCYTES NFR BLD AUTO: 0.3 % — SIGNIFICANT CHANGE UP (ref 0–1.5)
KETONES UR-MCNC: NEGATIVE — SIGNIFICANT CHANGE UP
LEUKOCYTE ESTERASE UR-ACNC: ABNORMAL
LYMPHOCYTES # BLD AUTO: 1.85 K/UL — SIGNIFICANT CHANGE UP (ref 1–3.3)
LYMPHOCYTES # BLD AUTO: 23.8 % — SIGNIFICANT CHANGE UP (ref 13–44)
MCHC RBC-ENTMCNC: 31.3 PG — SIGNIFICANT CHANGE UP (ref 27–34)
MCHC RBC-ENTMCNC: 31.6 PG — SIGNIFICANT CHANGE UP (ref 27–34)
MCHC RBC-ENTMCNC: 31.9 GM/DL — LOW (ref 32–36)
MCHC RBC-ENTMCNC: 32 GM/DL — SIGNIFICANT CHANGE UP (ref 32–36)
MCV RBC AUTO: 98 FL — SIGNIFICANT CHANGE UP (ref 80–100)
MCV RBC AUTO: 98.9 FL — SIGNIFICANT CHANGE UP (ref 80–100)
MONOCYTES # BLD AUTO: 0.56 K/UL — SIGNIFICANT CHANGE UP (ref 0–0.9)
MONOCYTES NFR BLD AUTO: 7.2 % — SIGNIFICANT CHANGE UP (ref 2–14)
NEUTROPHILS # BLD AUTO: 5 K/UL — SIGNIFICANT CHANGE UP (ref 1.8–7.4)
NEUTROPHILS NFR BLD AUTO: 64.5 % — SIGNIFICANT CHANGE UP (ref 43–77)
NITRITE UR-MCNC: NEGATIVE — SIGNIFICANT CHANGE UP
NRBC # BLD: 0 /100 WBCS — SIGNIFICANT CHANGE UP (ref 0–0)
NRBC # BLD: 0 /100 WBCS — SIGNIFICANT CHANGE UP (ref 0–0)
PH UR: 7.5 — SIGNIFICANT CHANGE UP (ref 5–8)
PLATELET # BLD AUTO: 197 K/UL — SIGNIFICANT CHANGE UP (ref 150–400)
PLATELET # BLD AUTO: 240 K/UL — SIGNIFICANT CHANGE UP (ref 150–400)
PROT UR-MCNC: ABNORMAL
RBC # BLD: 3.48 M/UL — LOW (ref 3.8–5.2)
RBC # BLD: 3.64 M/UL — LOW (ref 3.8–5.2)
RBC # FLD: 11.9 % — SIGNIFICANT CHANGE UP (ref 10.3–14.5)
RBC # FLD: 12.2 % — SIGNIFICANT CHANGE UP (ref 10.3–14.5)
RBC CASTS # UR COMP ASSIST: 171 /HPF — HIGH (ref 0–4)
SP GR SPEC: 1.01 — LOW (ref 1.01–1.02)
UROBILINOGEN FLD QL: NEGATIVE — SIGNIFICANT CHANGE UP
WBC # BLD: 10.87 K/UL — HIGH (ref 3.8–10.5)
WBC # BLD: 7.76 K/UL — SIGNIFICANT CHANGE UP (ref 3.8–10.5)
WBC # FLD AUTO: 10.87 K/UL — HIGH (ref 3.8–10.5)
WBC # FLD AUTO: 7.76 K/UL — SIGNIFICANT CHANGE UP (ref 3.8–10.5)
WBC UR QL: 3 /HPF — SIGNIFICANT CHANGE UP (ref 0–5)

## 2020-07-27 PROCEDURE — 93976 VASCULAR STUDY: CPT | Mod: 26

## 2020-07-27 PROCEDURE — 76817 TRANSVAGINAL US OBSTETRIC: CPT

## 2020-07-27 PROCEDURE — 93005 ELECTROCARDIOGRAM TRACING: CPT | Mod: XU

## 2020-07-27 PROCEDURE — 99236 HOSP IP/OBS SAME DATE HI 85: CPT | Mod: CR

## 2020-07-27 PROCEDURE — 86900 BLOOD TYPING SEROLOGIC ABO: CPT

## 2020-07-27 PROCEDURE — 84132 ASSAY OF SERUM POTASSIUM: CPT

## 2020-07-27 PROCEDURE — 96374 THER/PROPH/DIAG INJ IV PUSH: CPT

## 2020-07-27 PROCEDURE — 84295 ASSAY OF SERUM SODIUM: CPT

## 2020-07-27 PROCEDURE — 87086 URINE CULTURE/COLONY COUNT: CPT

## 2020-07-27 PROCEDURE — 83605 ASSAY OF LACTIC ACID: CPT

## 2020-07-27 PROCEDURE — 96361 HYDRATE IV INFUSION ADD-ON: CPT

## 2020-07-27 PROCEDURE — 86850 RBC ANTIBODY SCREEN: CPT

## 2020-07-27 PROCEDURE — 85610 PROTHROMBIN TIME: CPT

## 2020-07-27 PROCEDURE — 85384 FIBRINOGEN ACTIVITY: CPT

## 2020-07-27 PROCEDURE — 82803 BLOOD GASES ANY COMBINATION: CPT

## 2020-07-27 PROCEDURE — 86870 RBC ANTIBODY IDENTIFICATION: CPT

## 2020-07-27 PROCEDURE — 85027 COMPLETE CBC AUTOMATED: CPT

## 2020-07-27 PROCEDURE — 80053 COMPREHEN METABOLIC PANEL: CPT

## 2020-07-27 PROCEDURE — 85730 THROMBOPLASTIN TIME PARTIAL: CPT

## 2020-07-27 PROCEDURE — 93976 VASCULAR STUDY: CPT

## 2020-07-27 PROCEDURE — 99284 EMERGENCY DEPT VISIT MOD MDM: CPT | Mod: 25

## 2020-07-27 PROCEDURE — G0378: CPT

## 2020-07-27 PROCEDURE — 85014 HEMATOCRIT: CPT

## 2020-07-27 PROCEDURE — 86901 BLOOD TYPING SEROLOGIC RH(D): CPT

## 2020-07-27 PROCEDURE — 76830 TRANSVAGINAL US NON-OB: CPT

## 2020-07-27 PROCEDURE — 87635 SARS-COV-2 COVID-19 AMP PRB: CPT

## 2020-07-27 PROCEDURE — 81001 URINALYSIS AUTO W/SCOPE: CPT

## 2020-07-27 PROCEDURE — 82435 ASSAY OF BLOOD CHLORIDE: CPT

## 2020-07-27 PROCEDURE — 76817 TRANSVAGINAL US OBSTETRIC: CPT | Mod: 26

## 2020-07-27 PROCEDURE — 82947 ASSAY GLUCOSE BLOOD QUANT: CPT

## 2020-07-27 PROCEDURE — 82330 ASSAY OF CALCIUM: CPT

## 2020-07-27 RX ORDER — ACETAMINOPHEN 500 MG
975 TABLET ORAL ONCE
Refills: 0 | Status: COMPLETED | OUTPATIENT
Start: 2020-07-27 | End: 2020-07-27

## 2020-07-27 RX ORDER — SODIUM CHLORIDE 9 MG/ML
1000 INJECTION INTRAMUSCULAR; INTRAVENOUS; SUBCUTANEOUS ONCE
Refills: 0 | Status: COMPLETED | OUTPATIENT
Start: 2020-07-27 | End: 2020-07-27

## 2020-07-27 RX ORDER — SODIUM CHLORIDE 9 MG/ML
3 INJECTION INTRAMUSCULAR; INTRAVENOUS; SUBCUTANEOUS EVERY 8 HOURS
Refills: 0 | Status: DISCONTINUED | OUTPATIENT
Start: 2020-07-27 | End: 2020-07-30

## 2020-07-27 RX ADMIN — SODIUM CHLORIDE 3 MILLILITER(S): 9 INJECTION INTRAMUSCULAR; INTRAVENOUS; SUBCUTANEOUS at 06:19

## 2020-07-27 RX ADMIN — SODIUM CHLORIDE 1000 MILLILITER(S): 9 INJECTION INTRAMUSCULAR; INTRAVENOUS; SUBCUTANEOUS at 00:25

## 2020-07-27 RX ADMIN — Medication 975 MILLIGRAM(S): at 00:25

## 2020-07-27 NOTE — ED CDU PROVIDER INITIAL DAY NOTE - PROGRESS NOTE DETAILS
CDU PROGRESS NOTE PA MICHAEL: Pt resting comfortably, feeling well without complaint. Pt reports having mild vaginal bleeding, improved from earlier. Will continue to monitor, repeat H/H and monitor vitals. CDU PROGRESS NOTE PA MICHAEL: Pt went for Transvaginal US. Patient seen at bedside. VSS. Patient resting comfortably, no complaints. Endorses improvement in symptoms, with decreased vaginal bleeding. Pending Transvaginal US report. Will reevaluate. - Joi Kern PA-C [facility] Reviewed recent orthostatics, rpt H/H, and Ultrasound results. US transvaginal: "The endometrium appears distended with complex fluid/blood products.  Split wall endometrial thickness measures up to approximately 12 mm.  Prominent vascularity primarily appears at the endometrial-myometrial junction.  No definitive evidence for retained products of conception. "Orthostatics: HR lying 56bpm and standing 88bpm. Derzie and OB aware. Patient states that bleeding has decreased. Denies dizziness, light headiness, chest pain, SOB with sitting and standing. Patient to be DC home with very strict return precautions. - Joi Kern PA-C Spoke with CARLOS ALBERTO Tsang from OBGYN. Reviewed recent orthostatics, rpt H/H, and Ultrasound results. US transvaginal: "The endometrium appears distended with complex fluid/blood products.  Split wall endometrial thickness measures up to approximately 12 mm.  Prominent vascularity primarily appears at the endometrial-myometrial junction.  No definitive evidence for retained products of conception. "Orthostatics: HR lying 56bpm and standing 88bpm. Derzie and OB aware. Patient states that bleeding has decreased. Denies dizziness, light headiness, chest pain, SOB with sitting and standing. Per OB patient can follow up outpatient with OB. Patient to be DC home with very strict return precautions. RICK Wong. - Joi Kern PA-C

## 2020-07-27 NOTE — ED CDU PROVIDER DISPOSITION NOTE - PATIENT PORTAL LINK FT
You can access the FollowMyHealth Patient Portal offered by [facility] by registering at the following website: [email or web address]. By joining [employer]’s FollowMyHealth portal, you will also be able to view your health information using other applications (apps) compatible with our system.

## 2020-07-27 NOTE — ED CDU PROVIDER INITIAL DAY NOTE - DETAILS
29y  PPD#10 s/p vaginal delivery presents with post-partum bleeding   Plan : frequent reeval, vitals q 4hrs, repeat labs, monitor for bleeding, Transvaginal US r/o Endometritis

## 2020-07-27 NOTE — ED CDU PROVIDER DISPOSITION NOTE - CLINICAL COURSE
29y  PPD#10 s/p vaginal delivery presents with vaginal bleeding.  Patient states bleeding began at noon and has been 8 pads since.  Bleeding up to that point was 3 pads a day.  Patient endorses some lightheadedness. Denies headache, fever, chills, CP or SOB. Denies urinary problems. Denies sensory changes or weakness to extremities.  In ED, patient had with persistent vaginal bleeding was evaluated by OB/GYN who recommended Patient to receive metheringe, pitocin, cytotec. Products removed on vaginal exam to pathology by OB/GYN. Laboratory significant for H/H 13.0/40.2--11.5/36.0  Pt had transabdominal US (limited) showing small amount of anechoic space within endometrium and cervix. Ob/GYN evaluated pt and deemed low risk Endometritis with no recs for antibiotics. Pt developed temp 100.6 and was given Tylenol 975mg in ED. Pt sent to CDU for frequent reeval, vitals q 4hrs, repeat labs, monitor for bleeding, Transvaginal US. 29y  PPD#10 s/p vaginal delivery presents with vaginal bleeding.  Patient states bleeding began at noon and has been 8 pads since.  Bleeding up to that point was 3 pads a day.  Patient endorses some lightheadedness. Denies headache, fever, chills, CP or SOB. Denies urinary problems. Denies sensory changes or weakness to extremities.  In ED, patient had with persistent vaginal bleeding was evaluated by OB/GYN who recommended Patient to receive metheringe, pitocin, cytotec. Products removed on vaginal exam to pathology by OB/GYN. Laboratory significant for H/H 13.0/40.2--11.5/36.0  Pt had transabdominal US (limited) showing small amount of anechoic space within endometrium and cervix. Ob/GYN evaluated pt and deemed low risk Endometritis with no recs for antibiotics. Pt developed temp 100.6 and was given Tylenol 975mg in ED. Pt sent to CDU for frequent reeval, vitals q 4hrs, repeat labs, monitor for bleeding, Transvaginal US.   Transvaginal US results show: "The endometrium appears distended with complex fluid/blood products.  Split wall endometrial thickness measures up to approximately 12 mm.  Prominent vascularity primarily appears at the endometrial-myometrial junction.  No definitive evidence for retained products of conception. "Orthostatics: HR lying 56bpm and standing 88bpm. Joselinee and OB aware. Patient states that bleeding has decreased. Denies dizziness, light headiness, chest pain, SOB with sitting and standing. Patient discharge home with OB follow up. RICK Tsang (from OB) and Marvin.

## 2020-07-27 NOTE — ED ADULT NURSE REASSESSMENT NOTE - NS ED NURSE REASSESS COMMENT FT1
Report taken from JUDIE Forde at 1900. Pt AAOx4, VS as documented. Pt states she is cont. to have vaginal bleeding. Pt also reports fatigue. Pt given medication per MD order. Pt denies N/V, abdominal pain/cramping, CP, SOB, lightheadedness/dizziness at this time. Pt educated to inform RN of any episodes of lightheadedness/dizziness, N/V, increased vaginal bleeding, or syncope. Pt given call bell and educated on call bell system. Safety maintained.
Pt received from JUDIE Mckeon. Pt oriented to CDU & plan of care was discussed. Pt A&O x 4. Pt in CDU for frequent reeval, vitals q 4hrs, repeat labs, monitor for bleeding, Transvaginal US r/o Endometritis. Pt denies any SOB, dizziness, lightheadedness, chills, or abdominal pain as of now. Pt states she feels fatigue and when she stands up she feels "shaky". Pt states that she is bleeding less than before, still bright red but with no clots. V/S stable, pt afebrile,  IV in place, patent and free of signs of infiltration. Pt resting in bed. Safety & comfort measures maintained. Call bell in reach. Will continue to monitor.

## 2020-07-27 NOTE — ED CDU PROVIDER DISPOSITION NOTE - NSFOLLOWUPINSTRUCTIONS_ED_ALL_ED_FT
As per OB/GYN............. 1. Rest. Stay hydrated.   2. Continue your current home medications.  3. Follow-up with your OBGYN in 1-2 days.  Bring your results with you for follow-up.  4. Return to the ER if you have any new or worsening symptoms, increased bleeding, dizziness, vomiting, change in vision, chest pain, difficulty breathing, fevers, chills, weakness, or any other concerns.

## 2020-07-27 NOTE — ED CDU PROVIDER INITIAL DAY NOTE - ATTENDING CONTRIBUTION TO CARE
29y  PPD#10 s/p vaginal delivery presents with vaginal bleeding. ER workup notable for mild vaginal bleeding. transabd US notable for anechoic space within endometrium and cervix likely small blood; overall not consistent with endometritis however very limited study given lack of transvaginal imaging. H&H stable. Patient remained clinically stable. Patient evaluated by OBGYN attending and made med recs for vaginal bleeding but did not offer any surgical intervention. Patient slightly orthostatic during ER visit with slight decrease in H&h thought likely 2/2 hemodilution with fluids received in ER. Patient sent to CDU for another set of repeat labs, TVUS, frequent reeval, q4-6 vitals and supportive care.

## 2020-07-27 NOTE — ED CDU PROVIDER INITIAL DAY NOTE - OBJECTIVE STATEMENT
29y  PPD#10 s/p vaginal delivery presents with vaginal bleeding.  Patient states bleeding began at noon and has been 8 pads since.  Bleeding up to that point was 3 pads a day.  Patient endorses some lightheadedness. Denies headache, fever, chills, CP or SOB. Denies urinary problems. Denies sensory changes or weakness to extremities.  In ED, patient had with persistent vaginal bleeding was evaluated by OB/GYN who recommended Patient to receive metheringe, pitocin, cytotec. Products removed on vaginal exam to pathology by OB/GYN. Laboratory significant for H/H 13.0/40.2--11.5/36.0  Pt had transabdominal US (limited) showing small amount of anechoic space within endometrium and cervix. Ob/GYN evaluated pt and deemed low risk Endometritis with no recs for antibiotics. Pt developed temp 100.6 and was given Tylenol 975mg in ED. Pt sent to CDU for frequent reeval, vitals q 4hrs, repeat labs, monitor for bleeding, Transvaginal US.

## 2020-07-29 LAB
CULTURE RESULTS: SIGNIFICANT CHANGE UP
SPECIMEN SOURCE: SIGNIFICANT CHANGE UP

## 2021-04-28 NOTE — PATIENT PROFILE OB - MEDICAL/SURG HX
Consent: The patient's consent was obtained including but not limited to risks of crusting, scabbing, blistering, scarring, darker or lighter pigmentary change, recurrence, incomplete removal and infection. For Medical Surgical Hx obtained at Admission, Please see Provider H&P Render Post-Care Instructions In Note?: no Post-Care Instructions: I reviewed with the patient in detail post-care instructions. Patient is to wear sunprotection, and avoid picking at any of the treated lesions. Pt may apply Vaseline to crusted or scabbing areas. Detail Level: Detailed Duration Of Freeze Thaw-Cycle (Seconds): 0

## 2021-05-25 NOTE — DISCHARGE NOTE OB - AVOID SITTING IN ONE POSITION FOR MORE THAN ONE HOUR
Statement Selected Imiquimod Counseling:  I discussed with the patient the risks of imiquimod including but not limited to erythema, scaling, itching, weeping, crusting, and pain.  Patient understands that the inflammatory response to imiquimod is variable from person to person and was educated regarded proper titration schedule.  If flu-like symptoms develop, patient knows to discontinue the medication and contact us.

## 2022-01-05 ENCOUNTER — RESULT REVIEW (OUTPATIENT)
Age: 32
End: 2022-01-05

## 2022-03-08 PROBLEM — Z78.9 OTHER SPECIFIED HEALTH STATUS: Chronic | Status: ACTIVE | Noted: 2020-07-27

## 2022-03-18 ENCOUNTER — APPOINTMENT (OUTPATIENT)
Dept: ANTEPARTUM | Facility: CLINIC | Age: 32
End: 2022-03-18
Payer: COMMERCIAL

## 2022-03-18 ENCOUNTER — ASOB RESULT (OUTPATIENT)
Age: 32
End: 2022-03-18

## 2022-03-18 PROCEDURE — 76811 OB US DETAILED SNGL FETUS: CPT

## 2022-05-17 ENCOUNTER — APPOINTMENT (OUTPATIENT)
Dept: ANTEPARTUM | Facility: CLINIC | Age: 32
End: 2022-05-17
Payer: COMMERCIAL

## 2022-05-17 ENCOUNTER — ASOB RESULT (OUTPATIENT)
Age: 32
End: 2022-05-17

## 2022-05-17 PROCEDURE — 76819 FETAL BIOPHYS PROFIL W/O NST: CPT

## 2022-05-17 PROCEDURE — 76816 OB US FOLLOW-UP PER FETUS: CPT

## 2022-07-21 ENCOUNTER — INPATIENT (INPATIENT)
Facility: HOSPITAL | Age: 32
LOS: 0 days | Discharge: HOME | End: 2022-07-22
Attending: OBSTETRICS & GYNECOLOGY | Admitting: OBSTETRICS & GYNECOLOGY
Payer: COMMERCIAL

## 2022-07-21 VITALS — OXYGEN SATURATION: 100 % | HEART RATE: 77 BPM

## 2022-07-21 DIAGNOSIS — O26.899 OTHER SPECIFIED PREGNANCY RELATED CONDITIONS, UNSPECIFIED TRIMESTER: ICD-10-CM

## 2022-07-21 DIAGNOSIS — Z34.80 ENCOUNTER FOR SUPERVISION OF OTHER NORMAL PREGNANCY, UNSPECIFIED TRIMESTER: ICD-10-CM

## 2022-07-21 DIAGNOSIS — Z3A.00 WEEKS OF GESTATION OF PREGNANCY NOT SPECIFIED: ICD-10-CM

## 2022-07-21 LAB
BASOPHILS # BLD AUTO: 0.03 K/UL — SIGNIFICANT CHANGE UP (ref 0–0.2)
BASOPHILS NFR BLD AUTO: 0.3 % — SIGNIFICANT CHANGE UP (ref 0–2)
BLD GP AB SCN SERPL QL: POSITIVE — SIGNIFICANT CHANGE UP
COVID-19 SPIKE DOMAIN AB INTERP: POSITIVE
COVID-19 SPIKE DOMAIN ANTIBODY RESULT: >250 U/ML — HIGH
EOSINOPHIL # BLD AUTO: 0.08 K/UL — SIGNIFICANT CHANGE UP (ref 0–0.5)
EOSINOPHIL NFR BLD AUTO: 0.8 % — SIGNIFICANT CHANGE UP (ref 0–6)
HCT VFR BLD CALC: 39.2 % — SIGNIFICANT CHANGE UP (ref 34.5–45)
HGB BLD-MCNC: 12.5 G/DL — SIGNIFICANT CHANGE UP (ref 11.5–15.5)
IMM GRANULOCYTES NFR BLD AUTO: 0.7 % — SIGNIFICANT CHANGE UP (ref 0–1.5)
LYMPHOCYTES # BLD AUTO: 1.69 K/UL — SIGNIFICANT CHANGE UP (ref 1–3.3)
LYMPHOCYTES # BLD AUTO: 17.7 % — SIGNIFICANT CHANGE UP (ref 13–44)
MCHC RBC-ENTMCNC: 29.4 PG — SIGNIFICANT CHANGE UP (ref 27–34)
MCHC RBC-ENTMCNC: 31.9 GM/DL — LOW (ref 32–36)
MCV RBC AUTO: 92.2 FL — SIGNIFICANT CHANGE UP (ref 80–100)
MONOCYTES # BLD AUTO: 0.64 K/UL — SIGNIFICANT CHANGE UP (ref 0–0.9)
MONOCYTES NFR BLD AUTO: 6.7 % — SIGNIFICANT CHANGE UP (ref 2–14)
NEUTROPHILS # BLD AUTO: 7.05 K/UL — SIGNIFICANT CHANGE UP (ref 1.8–7.4)
NEUTROPHILS NFR BLD AUTO: 73.8 % — SIGNIFICANT CHANGE UP (ref 43–77)
NRBC # BLD: 0 /100 WBCS — SIGNIFICANT CHANGE UP (ref 0–0)
PLATELET # BLD AUTO: 207 K/UL — SIGNIFICANT CHANGE UP (ref 150–400)
RBC # BLD: 4.25 M/UL — SIGNIFICANT CHANGE UP (ref 3.8–5.2)
RBC # FLD: 13.9 % — SIGNIFICANT CHANGE UP (ref 10.3–14.5)
RH IG SCN BLD-IMP: NEGATIVE — SIGNIFICANT CHANGE UP
SARS-COV-2 IGG+IGM SERPL QL IA: >250 U/ML — HIGH
SARS-COV-2 IGG+IGM SERPL QL IA: POSITIVE
SARS-COV-2 RNA SPEC QL NAA+PROBE: SIGNIFICANT CHANGE UP
T PALLIDUM AB TITR SER: NEGATIVE — SIGNIFICANT CHANGE UP
WBC # BLD: 9.56 K/UL — SIGNIFICANT CHANGE UP (ref 3.8–10.5)
WBC # FLD AUTO: 9.56 K/UL — SIGNIFICANT CHANGE UP (ref 3.8–10.5)

## 2022-07-21 RX ORDER — TETANUS TOXOID, REDUCED DIPHTHERIA TOXOID AND ACELLULAR PERTUSSIS VACCINE, ADSORBED 5; 2.5; 8; 8; 2.5 [IU]/.5ML; [IU]/.5ML; UG/.5ML; UG/.5ML; UG/.5ML
0.5 SUSPENSION INTRAMUSCULAR ONCE
Refills: 0 | Status: DISCONTINUED | OUTPATIENT
Start: 2022-07-21 | End: 2022-07-22

## 2022-07-21 RX ORDER — MAGNESIUM HYDROXIDE 400 MG/1
30 TABLET, CHEWABLE ORAL
Refills: 0 | Status: DISCONTINUED | OUTPATIENT
Start: 2022-07-21 | End: 2022-07-22

## 2022-07-21 RX ORDER — SODIUM CHLORIDE 9 MG/ML
1000 INJECTION, SOLUTION INTRAVENOUS
Refills: 0 | Status: DISCONTINUED | OUTPATIENT
Start: 2022-07-21 | End: 2022-07-21

## 2022-07-21 RX ORDER — IBUPROFEN 200 MG
600 TABLET ORAL EVERY 6 HOURS
Refills: 0 | Status: COMPLETED | OUTPATIENT
Start: 2022-07-21 | End: 2023-06-19

## 2022-07-21 RX ORDER — SODIUM CHLORIDE 9 MG/ML
3 INJECTION INTRAMUSCULAR; INTRAVENOUS; SUBCUTANEOUS EVERY 8 HOURS
Refills: 0 | Status: DISCONTINUED | OUTPATIENT
Start: 2022-07-21 | End: 2022-07-22

## 2022-07-21 RX ORDER — OXYCODONE HYDROCHLORIDE 5 MG/1
5 TABLET ORAL
Refills: 0 | Status: DISCONTINUED | OUTPATIENT
Start: 2022-07-21 | End: 2022-07-22

## 2022-07-21 RX ORDER — DIPHENHYDRAMINE HCL 50 MG
25 CAPSULE ORAL EVERY 6 HOURS
Refills: 0 | Status: DISCONTINUED | OUTPATIENT
Start: 2022-07-21 | End: 2022-07-22

## 2022-07-21 RX ORDER — KETOROLAC TROMETHAMINE 30 MG/ML
30 SYRINGE (ML) INJECTION ONCE
Refills: 0 | Status: DISCONTINUED | OUTPATIENT
Start: 2022-07-21 | End: 2022-07-22

## 2022-07-21 RX ORDER — OXYCODONE HYDROCHLORIDE 5 MG/1
5 TABLET ORAL ONCE
Refills: 0 | Status: DISCONTINUED | OUTPATIENT
Start: 2022-07-21 | End: 2022-07-22

## 2022-07-21 RX ORDER — AER TRAVELER 0.5 G/1
1 SOLUTION RECTAL; TOPICAL EVERY 4 HOURS
Refills: 0 | Status: DISCONTINUED | OUTPATIENT
Start: 2022-07-21 | End: 2022-07-22

## 2022-07-21 RX ORDER — PRAMOXINE HYDROCHLORIDE 150 MG/15G
1 AEROSOL, FOAM RECTAL EVERY 4 HOURS
Refills: 0 | Status: DISCONTINUED | OUTPATIENT
Start: 2022-07-21 | End: 2022-07-22

## 2022-07-21 RX ORDER — IBUPROFEN 200 MG
600 TABLET ORAL EVERY 6 HOURS
Refills: 0 | Status: DISCONTINUED | OUTPATIENT
Start: 2022-07-21 | End: 2022-07-22

## 2022-07-21 RX ORDER — ACETAMINOPHEN 500 MG
975 TABLET ORAL
Refills: 0 | Status: DISCONTINUED | OUTPATIENT
Start: 2022-07-21 | End: 2022-07-22

## 2022-07-21 RX ORDER — HYDROCORTISONE 1 %
1 OINTMENT (GRAM) TOPICAL EVERY 6 HOURS
Refills: 0 | Status: DISCONTINUED | OUTPATIENT
Start: 2022-07-21 | End: 2022-07-22

## 2022-07-21 RX ORDER — DIBUCAINE 1 %
1 OINTMENT (GRAM) RECTAL EVERY 6 HOURS
Refills: 0 | Status: DISCONTINUED | OUTPATIENT
Start: 2022-07-21 | End: 2022-07-22

## 2022-07-21 RX ORDER — CITRIC ACID/SODIUM CITRATE 300-500 MG
15 SOLUTION, ORAL ORAL EVERY 6 HOURS
Refills: 0 | Status: DISCONTINUED | OUTPATIENT
Start: 2022-07-21 | End: 2022-07-21

## 2022-07-21 RX ORDER — OXYTOCIN 10 UNIT/ML
333.33 VIAL (ML) INJECTION
Qty: 20 | Refills: 0 | Status: DISCONTINUED | OUTPATIENT
Start: 2022-07-21 | End: 2022-07-21

## 2022-07-21 RX ORDER — CHLORHEXIDINE GLUCONATE 213 G/1000ML
1 SOLUTION TOPICAL ONCE
Refills: 0 | Status: DISCONTINUED | OUTPATIENT
Start: 2022-07-21 | End: 2022-07-21

## 2022-07-21 RX ORDER — BENZOCAINE 10 %
1 GEL (GRAM) MUCOUS MEMBRANE EVERY 6 HOURS
Refills: 0 | Status: DISCONTINUED | OUTPATIENT
Start: 2022-07-21 | End: 2022-07-22

## 2022-07-21 RX ORDER — OXYTOCIN 10 UNIT/ML
333.33 VIAL (ML) INJECTION
Qty: 20 | Refills: 0 | Status: DISCONTINUED | OUTPATIENT
Start: 2022-07-21 | End: 2022-07-22

## 2022-07-21 RX ORDER — SIMETHICONE 80 MG/1
80 TABLET, CHEWABLE ORAL EVERY 4 HOURS
Refills: 0 | Status: DISCONTINUED | OUTPATIENT
Start: 2022-07-21 | End: 2022-07-22

## 2022-07-21 RX ORDER — LANOLIN
1 OINTMENT (GRAM) TOPICAL EVERY 6 HOURS
Refills: 0 | Status: DISCONTINUED | OUTPATIENT
Start: 2022-07-21 | End: 2022-07-22

## 2022-07-21 RX ADMIN — SODIUM CHLORIDE 3 MILLILITER(S): 9 INJECTION INTRAMUSCULAR; INTRAVENOUS; SUBCUTANEOUS at 21:38

## 2022-07-21 NOTE — OB PROVIDER H&P - NSHPREVIEWOFSYSTEMS_GEN_ALL_CORE
ICU Vital Signs Last 24 Hrs  T(C): --  T(F): --  HR: 84 (21 Jul 2022 15:01) (77 - 105)  BP: 106/66 (21 Jul 2022 15:01) (106/66 - 115/68)  BP(mean): --  ABP: --  ABP(mean): --  RR: --  SpO2: 100% (21 Jul 2022 14:59) (86% - 100%)    gen: A&ox3  CV: rrr s1s2  abd: gravid soft  VE: 7cm  EFM: 145 moderate variability, + acels, -decels  toco: none

## 2022-07-21 NOTE — OB RN PATIENT PROFILE - NS_PRENATALLABSOURCEGBS1PN_OBGYN_ALL_OB
[NL] : clear tympanic membranes bilaterally [Erythematous Oropharynx] : erythematous oropharynx [FreeTextEntry3] : LEFT CANAL ERYTHEMA AND TENDERNESS unknown

## 2022-07-21 NOTE — OB RN DELIVERY SUMMARY - NSSELHIDDEN_OBGYN_ALL_OB_FT
[NS_DeliveryAttending1_OBGYN_ALL_OB_FT:[email or web address]=],[NS_DeliveryRN_OBGYN_ALL_OB_FT:[email or web address]=]

## 2022-07-21 NOTE — OB RN PATIENT PROFILE - HEIGHT IN INCHES
What Type Of Note Output Would You Prefer (Optional)?: Standard Output Is The Patient Presenting As Previously Scheduled?: No, they are coming in before their scheduled appointment How Severe Is Your Rash?: moderate Is This A New Presentation, Or A Follow-Up?: Rash 6

## 2022-07-21 NOTE — OB RN DELIVERY SUMMARY - NS_SEPSISRSKCALC_OBGYN_ALL_OB_FT
EOS calculated successfully. EOS Risk Factor: 0.5/1000 live births ([facility] [place]note 2[place] national incidence); GA=38w5d; Temp=98.96; ROM=0.767; GBS='Negative'; Antibiotics='No antibiotics or any antibiotics < 2 hrs prior to birth'

## 2022-07-21 NOTE — OB RN PATIENT PROFILE - FUNCTIONAL ASSESSMENT - BASIC MOBILITY 6.
4-calculated by average/Not able to assess (calculate score using [facility] averaging method)

## 2022-07-21 NOTE — OB PROVIDER H&P - ASSESSMENT
30 y/o  @ 38.5 weeks admitted in active labor with a history of preciptious deliveries  -admit to L&D  -routine labs  -NPO bicitra  -EFM/toco  -IV hydration  -anesthesia consult  -anticipated     d/w Dr. Chinedu Jennings NP

## 2022-07-21 NOTE — OB PROVIDER H&P - HISTORY OF PRESENT ILLNESS
The patient is a 32 y/o  EDC 2022 @ 38.5 weeks admitted in labor. The patient denies LOF, VB.  The patient reports consistent contractions.  The patient endorses good fetal movement. The patient accepts all blood products    allergies: nkda  meds: PNV    GBS: (2022), (2022) : negative  EFW: 3200g    Medhx: pt denies cardiac, pulm, heme, GI, neuro  OBhx:   male 7 lbs 14oz uncomplicated    female 7 lbs preciptious delivery, 1 hr of contractions until delivery    male 7 lbs precipitous delivery    female 7 lbs, PPH requiring ED evaluation  Sxhx: pt denies  Gynhx: pt denies cysts, fibroids, abn. pap, STDs  Sochx: pt denies  Famhx: pt denies

## 2022-07-21 NOTE — OB PROVIDER DELIVERY SUMMARY - NSPROVIDERDELIVERYNOTE_OBGYN_ALL_OB_FT
, pt delivered of a viable female infant apgar 9/9, placenta complete and uterus explored. no laceration. ebl 75 cc.

## 2022-07-22 ENCOUNTER — TRANSCRIPTION ENCOUNTER (OUTPATIENT)
Age: 32
End: 2022-07-22

## 2022-07-22 VITALS
DIASTOLIC BLOOD PRESSURE: 89 MMHG | SYSTOLIC BLOOD PRESSURE: 118 MMHG | TEMPERATURE: 98 F | HEART RATE: 78 BPM | OXYGEN SATURATION: 99 % | RESPIRATION RATE: 17 BRPM

## 2022-07-22 LAB — KLEIHAUER-BETKE CALCULATION: 0 % — SIGNIFICANT CHANGE UP (ref 0–0.3)

## 2022-07-22 PROCEDURE — 86850 RBC ANTIBODY SCREEN: CPT

## 2022-07-22 PROCEDURE — G0463: CPT

## 2022-07-22 PROCEDURE — 85460 HEMOGLOBIN FETAL: CPT

## 2022-07-22 PROCEDURE — 87635 SARS-COV-2 COVID-19 AMP PRB: CPT

## 2022-07-22 PROCEDURE — 59050 FETAL MONITOR W/REPORT: CPT

## 2022-07-22 PROCEDURE — 86900 BLOOD TYPING SEROLOGIC ABO: CPT

## 2022-07-22 PROCEDURE — 86077 PHYS BLOOD BANK SERV XMATCH: CPT

## 2022-07-22 PROCEDURE — 86769 SARS-COV-2 COVID-19 ANTIBODY: CPT

## 2022-07-22 PROCEDURE — 59025 FETAL NON-STRESS TEST: CPT

## 2022-07-22 PROCEDURE — 36415 COLL VENOUS BLD VENIPUNCTURE: CPT

## 2022-07-22 PROCEDURE — 85025 COMPLETE CBC W/AUTO DIFF WBC: CPT

## 2022-07-22 PROCEDURE — 86780 TREPONEMA PALLIDUM: CPT

## 2022-07-22 PROCEDURE — 86870 RBC ANTIBODY IDENTIFICATION: CPT

## 2022-07-22 PROCEDURE — 86901 BLOOD TYPING SEROLOGIC RH(D): CPT

## 2022-07-22 PROCEDURE — U0003: CPT

## 2022-07-22 RX ORDER — ACETAMINOPHEN 500 MG
3 TABLET ORAL
Qty: 0 | Refills: 0 | DISCHARGE
Start: 2022-07-22

## 2022-07-22 RX ORDER — IBUPROFEN 200 MG
1 TABLET ORAL
Qty: 0 | Refills: 0 | DISCHARGE
Start: 2022-07-22

## 2022-07-22 RX ADMIN — SODIUM CHLORIDE 3 MILLILITER(S): 9 INJECTION INTRAMUSCULAR; INTRAVENOUS; SUBCUTANEOUS at 06:31

## 2022-07-22 RX ADMIN — Medication 600 MILLIGRAM(S): at 06:41

## 2022-07-22 RX ADMIN — Medication 600 MILLIGRAM(S): at 06:11

## 2022-07-22 NOTE — PROGRESS NOTE ADULT - ASSESSMENT
A/P:  31y  PPD # 1  s/p      PMHx:  Current Issues:    Increase OOB  Regular diet  PO Pain protocol  Routine Postpartum Care      Yuliya Oconnell PA-C

## 2022-07-22 NOTE — DISCHARGE NOTE NEWBORN - PATIENT PORTAL LINK FT
You can access the FollowMyHealth Patient Portal offered by [facility] by registering at the following website: [email or web address]. By joining [facility]’s FollowMyHealth portal, you will also be able to view your health information using other applications (apps) compatible with our system.

## 2022-07-22 NOTE — DISCHARGE NOTE OB - CARE PROVIDER_API CALL
Meghan Che  OBSTETRICS AND GYNECOLOGY  [address]  [place] [address]  Phone: [phone]  Fax: [phone]  Follow Up Time:

## 2022-07-22 NOTE — PROGRESS NOTE ADULT - SUBJECTIVE AND OBJECTIVE BOX
Postpartum Note- PPD#1    Allergies    No Known Allergies    Intolerances        Blood Type  O  --  Negative  Rubella Immune  RPR Negative      S:Patient is a  31y P5 now PPD #1 s/p   Patient w/o complaints, pain is controlled.    Pt is OOB, tolerating PO, passing flatus. Lochia WNL.   Feeding: breastfeeding     O:  Vital Signs Last 24 Hrs  T(C): 36.7 (2022 05:05), Max: 37.2 (2022 14:27)  T(F): 98 (2022 05:05), Max: 99 (2022 18:06)  HR: 78 (2022 05:05) (57 - 110)  BP: 118/89 (2022 05:05) (97/62 - 119/74)  BP(mean): --  RR: 17 (2022 05:05) (17 - 18)  SpO2: 99% (2022 05:05) (81% - 100%)    Parameters below as of 2022 05:05  Patient On (Oxygen Delivery Method): room air         Gen: NAD  CV: rrr s1s2, CTABL  Abdomen: Soft, nontender, non-distended, fundus firm.  Lochia: WNL  Perineum: normal appearing, no edema or active bleeding   Ext: Neg edema, Neg calf tenderness.  Pedal pulses palpated B/L    LABS:    Hemoglobin: 12.5 g/dL ( @ 15:41)      Hematocrit: 39.2 % ( @ 15:41)

## 2022-07-22 NOTE — DISCHARGE NOTE OB - MEDICATION SUMMARY - MEDICATIONS TO TAKE
I will START or STAY ON the medications listed below when I get home from the hospital:    acetaminophen 325 mg oral tablet  -- 3 tab(s) by mouth every 6 hours  -- Indication: For Mild pain    ibuprofen 600 mg oral tablet  -- 1 tab(s) by mouth every 6 hours  -- Indication: For Cramping

## 2022-07-22 NOTE — DISCHARGE NOTE NEWBORN - REPORT REDNESS, SWELLING OR DRAINAGE FROM CORD TO PEDIATRICIAN.
Urology Consult History and Physical    Name: Sandhya Trujillo    MRN: [id number]   YOB: 1956       We were asked to see Sandhya Trujillo for evaluation and treatment of gross hematuria.        Chief Complaint:   Post op pain and hemorrhage     History is obtained from the patient          History of Present Illness:   Sandhya Trujillo is a 63 year old female who is being seen for evaluation of post op hemorrhage, CB, and other medical issues. Recently s/p rectopexy as well as YARI/BSO at [facility]. Patient noted blood at the wound as well as tenderness at the incision. She presented to [facility] ED with complaints of same as well as SOB, abdominal pain, weakness, fatigue.   Upon presentation to ED, patient had humphries catheter placed with some difficulty likely due to post op edema in the perineum. Patient then noted to have gross hematuria.   Patient has seen Dr. Dumont in the past for OAB, NAVEEN. Also had an episode of gross hematuria for which she had a cystoscopy in 2016 (that was her last visit to Dr. Dumont).   Patient denies recent dysuria but noted decreased urine output just prior to presentation to the ED.     Currently urine in humphries tubing is clear.   CT scan reviewed. Noted to have pre-sacral hematoma. Unable to clearly visualize the bladder.   Renal U/s confirms hematoma but no hydronephrosis. Hematoma now extending into the retroperitoneum from the pelvis.            Past Medical History:     Past Medical History:   Diagnosis Date     Abnormal stress echo 11/08    stress test is normal but impaired LV relaxation, dilated LA, increased left atrial pressure and interatrial septum aneurysm     Anemia     secondary to large hiatal hernia with Memo erosion.      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 rec. continued statin therapy. u/s showed possible fatty liver. strongly enc. diet and exercise and repeat LFTs in 6 months     Elevated transaminase level 5/2013    Mild transaminase elevations     Essential hypertension      Femur fracture (H) 9/15    intertrochanteric fracture, s/p orif [facility]     GERD (gastroesophageal reflux disease)      Giant cell arteritis (H) 3/22/2019     Hepatitis B core antibody positive     SAb positive     Hiatal hernia 2/13    had upper GI and large hernia with erosions, with concommitant GERD; includes stomach and pancreas     Insomnia      Iron deficiency anemia 2009    anemia resolved,continues iron supplement for low normal ferritin levels,      Irregular heart beat     palpatations     Major depressive disorder, severe (H) 10/12/2017     Mixed hyperlipidemia      Moderate major depression (H)      Morbid obesity with BMI of 40.0-44.9, adult (H)      Multiple sclerosis (H)     Followed by Dr. 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 Currently on CellCept and methylprednisolone. IVIG infusions starting 8/19/19     Polymyositis with respiratory involvement (H) 4/5/2017     Pulmonary embolism (H) 3/15    found 7 on CT. on coumadin for life     Rectal prolapse      Rectocele 11/23/2016     Schatzki's ring 11/2010    dilated during EGD     Severe episode of recurrent major depressive disorder, without  psychotic features (H) 9/5/2017     Severe major depression without psychotic features (H) 9/25/2017     Thrombophlebitis of superficial veins of both lower extremities 4/17/2018    -On 12/16/2014, superficial thrombophlebitis at left ankle.  -On 12/20/2014, occluded thrombus of left greater saphenous vein extending from mid thigh to ankle.  -On 03/02/2015, left arm occlusive superficial venous thrombophlebitis involving the radial tributary of cephalic vein.  -On 03/03/2015, left occlusive superficial venous thrombophlebitis involving the greater saphenous vein from proximal     Thrombosis of leg     as child     Uterine prolapse 12/20/2011     Uterovaginal prolapse, complete 11/23/2016     Uterovaginal prolapse, incomplete 10/10    normal u/s            Past Surgical History:     Past Surgical History:   Procedure Laterality Date     BIOPSY MUSCLE DIAGNOSTIC (LOCATION)  1/9/2014    Procedure: BIOPSY MUSCLE DIAGNOSTIC (LOCATION);  Left Upper Arm Muscle Biopsy ;  Surgeon: Neha Gomez MD;  Location: [facility]     COLONOSCOPY  2008    normal     EXCISE BONE CYST SUBMAXILLARY  7/8/2013    Procedure: EXCISE BONE CYST MAXILLARY;  EXPLORATION OF RIGHT  MAXILLARY SINUS WITH BIOPSIES AND EXTRACTION OF TOOTH #1;  Surgeon: Mamadou Hyde MD;  Location: [facility]     EXTRACTION(S) DENTAL  7/8/2013    Procedure: EXTRACTION(S) DENTAL;  extraction of tooth #1;  Surgeon: Mamadou Hyde MD;  Location: [facility]     FRACTURE TX, HIP RT/LT  9/28/15    left     HC ESOPHAGOSCOPY, DIAGNOSTIC  2008    normal except for reactive gastropathy     SINUS SURGERY  07/08/2013     STRESS ECHO (METRO)  4/2012    no ischemic changes, EF 55-60%, hypertension at rest, exercised 6:30 min     UPPER GI ENDOSCOPY  2010 & 2013    large hiatel hernia            Social History:     Social History     Tobacco Use     Smoking status: Never Smoker     Smokeless tobacco: Never Used   Substance Use Topics     Alcohol use: Yes      Alcohol/week: 0.0 standard drinks     Comment: 1 every 3 months            Family History:     Family History   Problem Relation Age of Onset     Skin Cancer Mother         metastatic skin cancer     Heart Disease Mother         AFib     Hypertension Mother      Lipids Mother      Osteoporosis Mother      Thyroid Disease Mother         Thyroid removed/goiter, thyroidectomy     Diabetes Mother      Hyperlipidemia Mother      Coronary Artery Disease Mother      Fractures Mother         hip     Hypertension Father      Cerebrovascular Disease Father         TIA's at 91     Cardiovascular Father         MI     Other - See Comments Father         PE: Negative factor V     Hyperlipidemia Father      Coronary Artery Disease Father      Fractures Father         hip     Diabetes Sister      No Known Problems Sister      No Known Problems Sister      No Known Problems Sister      No Known Problems Brother      No Known Problems Brother      No Known Problems Daughter      No Known Problems Daughter      Cancer Daughter         Retinoblastoma and melanoma     Heart Disease Sister         had theumatic fever as child     Multiple Sclerosis Sister         MS     Hypertension Sister      Lipids Sister      Osteoporosis Maternal Aunt      Osteoporosis Maternal Uncle      Thrombophilia Other         niece     Other - See Comments Sister         PE. Negative factor V     Thrombophilia Other         cousin: positive factor V     Thrombophilia Other         Sister had a PE. No clotting disorder known     Thrombophilia Other         Father with frequent blood clots in the legs. Unknown whether DVT or not. No clotting disorder history known.      Hypertension Brother      Coronary Artery Disease Sister      Coronary Artery Disease Maternal Grandmother      Coronary Artery Disease Paternal Grandmother      Fractures Paternal Grandmother         hip     Coronary Artery Disease Maternal Aunt      Osteoporosis Paternal Aunt      Thyroid  "Disease Sister         nodules, Hashimoto     No Known Problems Maternal Grandfather               Allergies:     Allergies   Allergen Reactions     Macrobid [Nitrofurantoin] Rash     Vasculitis  Pt states that she was \"practically on her death bed.\"  And her legs turned boiling red.     Kiwi Itching     Pt states that tongue and lips swelled up     Metronidazole      PN: LW Reaction: burning skin sensation, itching all over            Medications:     Current Facility-Administered Medications   Medication     acetaminophen (TYLENOL) tablet 650 mg     albuterol (PROVENTIL) neb solution 2.5 mg     bisacodyl (DULCOLAX) Suppository 10 mg     busPIRone (BUSPAR) tablet 10 mg     heparin lock flush 10 UNIT/ML injection 2-5 mL     hydrocortisone sodium succinate PF (solu-CORTEF) injection 50 mg     HYDROmorphone (DILAUDID) tablet 4 mg     HYDROmorphone (PF) (DILAUDID) injection 0.3-0.5 mg     Ipratropium-Albuterol (COMBIVENT RESPIMAT) inhaler 1 puff     lidocaine (LMX4) cream     lidocaine (LMX4) cream     lidocaine (LMX4) cream     lidocaine 1 % 0.1-1 mL     lidocaine 1 % 0.1-1 mL     lidocaine 1 % 0.1-1 mL     lidocaine 1 % 0.1-1 mL     melatonin tablet 1 mg     naloxone (NARCAN) injection 0.1-0.4 mg     nitroGLYcerin (NITROSTAT) sublingual tablet 0.4 mg     ondansetron (ZOFRAN-ODT) ODT tab 4 mg    Or     ondansetron (ZOFRAN) injection 4 mg     prochlorperazine (COMPAZINE) injection 10 mg    Or     prochlorperazine (COMPAZINE) tablet 10 mg    Or     prochlorperazine (COMPAZINE) Suppository 25 mg     senna-docusate (SENOKOT-S/PERICOLACE) 8.6-50 MG per tablet 1 tablet    Or     senna-docusate (SENOKOT-S/PERICOLACE) 8.6-50 MG per tablet 2 tablet     sodium chloride (PF) 0.9% PF flush 10 mL     sodium chloride (PF) 0.9% PF flush 10-20 mL     sodium chloride (PF) 0.9% PF flush 3 mL     sodium chloride (PF) 0.9% PF flush 3 mL     sodium chloride (PF) 0.9% PF flush 3 mL     sodium chloride (PF) 0.9% PF flush 3 mL     sodium " chloride (PF) 0.9% PF flush 5-50 mL     sodium chloride 0.9% infusion     sodium chloride 0.9% infusion             Review of Systems:    ROS: 10 point ROS neg other than the symptoms noted above in the HPI and the chart.          Physical Exam:     Patient Vitals for the past 24 hrs:   BP Temp Temp src Pulse Heart Rate Resp SpO2 Height Weight   03/26/20 1400 108/66 -- -- 103 103 23 99 % -- --   03/26/20 1330 107/51 -- -- -- 96 26 93 % -- --   03/26/20 1310 97/53 -- -- -- 98 23 (!) 87 % -- --   03/26/20 1240 97/57 -- -- 95 95 26 97 % -- --   03/26/20 1215 95/59 97.9  F (36.6  C) -- 98 97 28 94 % -- --   03/26/20 1210 95/59 -- -- -- 93 25 (!) 88 % -- --   03/26/20 1140 91/49 -- -- 96 96 26 (!) 86 % -- --   03/26/20 1100 103/63 98.5  F (36.9  C) Oral 96 96 25 96 % -- --   03/26/20 1040 99/58 -- -- 96 95 23 92 % -- --   03/26/20 1020 98/55 -- -- -- 93 16 94 % -- --   03/26/20 1013 (!) 97/39 97.7  F (36.5  C) Oral -- 92 -- 100 % -- --   03/26/20 0945 (!) 89/69 97.4  F (36.3  C) -- 92 -- 18 -- -- --   03/26/20 0938 101/85 97.9  F (36.6  C) Oral 90 -- 20 100 % -- --   03/26/20 0728 102/72 98.2  F (36.8  C) Oral 98 -- 20 97 % -- --   03/26/20 0433 103/65 98.9  F (37.2  C) Oral 93 -- 20 97 % -- --   03/26/20 0415 104/61 -- -- 90 -- -- -- -- --   03/26/20 0315 102/62 -- -- 94 -- -- -- -- --   03/26/20 0217 95/56 -- -- 93 -- 20 96 % -- --   03/26/20 0207 96/60 98.4  F (36.9  C) Oral 94 -- 20 100 % -- --   03/26/20 0155 110/74 98.6  F (37  C) -- 96 -- 20 -- -- --   03/26/20 0009 111/75 98.4  F (36.9  C) Oral 98 -- 20 98 % -- --   03/25/20 1915 110/82 -- -- 91 -- -- 99 % -- --   03/25/20 1900 110/58 -- -- 92 -- -- 96 % -- --   03/25/20 1845 -- -- -- -- -- -- 97 % -- --   03/25/20 1830 127/72 -- -- 93 -- -- 95 % -- --   03/25/20 1825 -- -- -- -- -- -- 93 % -- --   03/25/20 1820 -- -- -- -- -- -- 97 % -- --   03/25/20 1815 -- -- -- 93 -- -- 96 % -- --   03/25/20 1810 135/69 -- -- -- -- -- -- -- --   03/25/20 1630 111/72 -- -- 92  "-- -- 96 % -- --   03/25/20 1555 107/80 -- -- 92 -- -- -- -- --   03/25/20 1551 -- -- -- -- -- -- 100 % -- --   03/25/20 1549 -- -- -- -- -- -- 100 % -- --   03/25/20 1545 113/73 98.8  F (37.1  C) Oral 89 -- 22 99 % 1.778 m (5' 10\") 127 kg (280 lb)     General: age-appropriate appearing female in NAD. obese body habitus.  HEENT: Head AT/NC  Resp: mild dyspnea, on supplemental O2  CV: negative   Abdomen: tender to palpation  :  Humphries catheter with clear urine output in tubing, some hematuria in humphries bag   LE:  pneumoboots in place.  Neuro:  moving all 4 extremities equally.  Skin: Multiple ecchymoses           Data:   All laboratory data reviewed:    Recent Labs   Lab 03/26/20  0922 03/26/20  0556 03/26/20  0003 03/25/20  1643   WBC  --  26.8*  --  25.1*   HGB 6.1* 6.7* 6.2* 7.3*   PLT  --  383  --  350     Recent Labs   Lab 03/26/20  0556 03/25/20  1643    140   POTASSIUM 5.1 5.0   CHLORIDE 108 110*   CO2 20 24   BUN 37* 29   CR 2.62* 1.88*   * 136*   KOSTAS 8.1* 8.4*     Recent Labs   Lab 03/25/20  1928   COLOR Yellow   APPEARANCE Slightly Cloudy   URINEGLC Negative   URINEBILI Moderate*   URINEKETONE Negative   SG 1.025   URINEPH 5.0   PROTEIN 10*   NITRITE Negative   LEUKEST Negative   RBCU 20*   WBCU 3       All pertinent imaging reviewed:    CT scan of the abdomen:   No masses, free air, abcesses or significant abnormalities  Pelvic hematoma noted, unable to visualize the bladder well.   Small right renal calculi, no significant hydronephrosis   Renal U/s as above             Impression and Plan:   Impression:   Patient s/p rectopexy, YARI/BSO at [facility] now with gross hematuria, also with CB and INR ~ 3.5        Plan:   Continue humphries drainage   If patient to have repeat CT scan, consider CT cystogram via humphries to better assess bladder as it was not well visualized on CT scan or on U/s   Hematoma noted on U/s, known pelvic hematoma   At present, continue to monitor urine outputs monitor for " recurrent gross hematuria   If plan to go to OR, would consider cystoscopy then but otherwise can be deferred until patient's clinical status improves (and can be completed as an outpatient)   Will continue to follow         Kenyetta Phan MD  March 26, 2020        Statement Selected

## 2022-07-22 NOTE — DISCHARGE NOTE OB - NS MD DC FALL RISK RISK
For information on Fall & Injury Prevention, visit: [email or web address] OR  [email or web address] OR  https://www.cdc.gov/steadi/patient.html

## 2022-07-22 NOTE — DISCHARGE NOTE OB - PATIENT PORTAL LINK FT
You can access the FollowMyHealth Patient Portal offered by [facility] by registering at the following website: [email or web address]. By joining [employer]’s FollowMyHealth portal, you will also be able to view your health information using other applications (apps) compatible with our system.

## 2022-07-22 NOTE — DISCHARGE NOTE OB - CARE PLAN
1 Principal Discharge DX:	 (normal spontaneous vaginal delivery)  Assessment and plan of treatment:	Return to baseline health, regular diet, pain control. Follow up with Dr. Che.

## 2022-12-27 NOTE — DISCHARGE NOTE NEWBORN - REAR FACING CAR SEAT IN BACKSEAT OF CAR PROPERLY BELTED IN.
Statement Selected ,[email or web address]

## 2023-04-07 ENCOUNTER — NON-APPOINTMENT (OUTPATIENT)
Age: 33
End: 2023-04-07

## 2023-11-23 ENCOUNTER — NON-APPOINTMENT (OUTPATIENT)
Age: 33
End: 2023-11-23

## 2023-12-19 NOTE — OB RN DELIVERY SUMMARY - NSNUMBEROFNEWBORNS_OBGYN_ALL_OB_NU
Pt is gogg1-yga-jl improved-katarzyna score 9/10-will continue oxygen in phase2 til ready to wean-he meets criteria for phase2 care per dr godfrey-to rm 3 via bed w joenrn  
1

## 2024-02-27 NOTE — OB PROVIDER H&P - PRETERM DELIVERIES, OB PROFILE
[FreeTextEntry1] : Alert, attentive, in no acute distress  Appropriate affect and mood No dysarthria No definite facial asymmetry appreciable. No ptosis  0

## 2024-04-11 ENCOUNTER — NON-APPOINTMENT (OUTPATIENT)
Age: 34
End: 2024-04-11

## 2024-07-16 ENCOUNTER — ASOB RESULT (OUTPATIENT)
Age: 34
End: 2024-07-16

## 2024-07-16 ENCOUNTER — APPOINTMENT (OUTPATIENT)
Dept: ANTEPARTUM | Facility: CLINIC | Age: 34
End: 2024-07-16
Payer: COMMERCIAL

## 2024-07-16 PROCEDURE — 76805 OB US >/= 14 WKS SNGL FETUS: CPT

## 2024-10-07 ENCOUNTER — ASOB RESULT (OUTPATIENT)
Age: 34
End: 2024-10-07

## 2024-10-07 ENCOUNTER — APPOINTMENT (OUTPATIENT)
Dept: ANTEPARTUM | Facility: CLINIC | Age: 34
End: 2024-10-07
Payer: COMMERCIAL

## 2024-10-07 PROCEDURE — 76816 OB US FOLLOW-UP PER FETUS: CPT

## 2024-11-18 ENCOUNTER — ASOB RESULT (OUTPATIENT)
Age: 34
End: 2024-11-18

## 2024-11-18 ENCOUNTER — APPOINTMENT (OUTPATIENT)
Dept: ANTEPARTUM | Facility: CLINIC | Age: 34
End: 2024-11-18
Payer: COMMERCIAL

## 2024-11-18 PROCEDURE — 76816 OB US FOLLOW-UP PER FETUS: CPT

## 2024-11-27 ENCOUNTER — INPATIENT (INPATIENT)
Facility: HOSPITAL | Age: 34
LOS: 1 days | Discharge: HOME | End: 2024-11-29
Attending: OBSTETRICS & GYNECOLOGY | Admitting: OBSTETRICS & GYNECOLOGY
Payer: COMMERCIAL

## 2024-11-27 VITALS — DIASTOLIC BLOOD PRESSURE: 73 MMHG | HEART RATE: 81 BPM | SYSTOLIC BLOOD PRESSURE: 116 MMHG

## 2024-11-27 DIAGNOSIS — Z34.80 ENCOUNTER FOR SUPERVISION OF OTHER NORMAL PREGNANCY, UNSPECIFIED TRIMESTER: ICD-10-CM

## 2024-11-27 DIAGNOSIS — O26.899 OTHER SPECIFIED PREGNANCY RELATED CONDITIONS, UNSPECIFIED TRIMESTER: ICD-10-CM

## 2024-11-27 DIAGNOSIS — Z3A.40 40 WEEKS GESTATION OF PREGNANCY: ICD-10-CM

## 2024-11-27 LAB
BASOPHILS # BLD AUTO: 0.02 K/UL — SIGNIFICANT CHANGE UP (ref 0–0.2)
BASOPHILS NFR BLD AUTO: 0.3 % — SIGNIFICANT CHANGE UP (ref 0–2)
BLD GP AB SCN SERPL QL: POSITIVE — SIGNIFICANT CHANGE UP
EOSINOPHIL # BLD AUTO: 0.06 K/UL — SIGNIFICANT CHANGE UP (ref 0–0.5)
EOSINOPHIL NFR BLD AUTO: 0.8 % — SIGNIFICANT CHANGE UP (ref 0–6)
HCT VFR BLD CALC: 39.3 % — SIGNIFICANT CHANGE UP (ref 34.5–45)
HGB BLD-MCNC: 13 G/DL — SIGNIFICANT CHANGE UP (ref 11.5–15.5)
HIV 1 & 2 AB SERPL IA.RAPID: SIGNIFICANT CHANGE UP
IMM GRANULOCYTES NFR BLD AUTO: 0.6 % — SIGNIFICANT CHANGE UP (ref 0–0.9)
LYMPHOCYTES # BLD AUTO: 1.8 K/UL — SIGNIFICANT CHANGE UP (ref 1–3.3)
LYMPHOCYTES # BLD AUTO: 24.8 % — SIGNIFICANT CHANGE UP (ref 13–44)
MCHC RBC-ENTMCNC: 33.1 G/DL — SIGNIFICANT CHANGE UP (ref 32–36)
MCHC RBC-ENTMCNC: 33.2 PG — SIGNIFICANT CHANGE UP (ref 27–34)
MCV RBC AUTO: 100.3 FL — HIGH (ref 80–100)
MONOCYTES # BLD AUTO: 0.61 K/UL — SIGNIFICANT CHANGE UP (ref 0–0.9)
MONOCYTES NFR BLD AUTO: 8.4 % — SIGNIFICANT CHANGE UP (ref 2–14)
NEUTROPHILS # BLD AUTO: 4.73 K/UL — SIGNIFICANT CHANGE UP (ref 1.8–7.4)
NEUTROPHILS NFR BLD AUTO: 65.1 % — SIGNIFICANT CHANGE UP (ref 43–77)
NRBC # BLD: 0 /100 WBCS — SIGNIFICANT CHANGE UP (ref 0–0)
PLATELET # BLD AUTO: 144 K/UL — LOW (ref 150–400)
RBC # BLD: 3.92 M/UL — SIGNIFICANT CHANGE UP (ref 3.8–5.2)
RBC # FLD: 13.1 % — SIGNIFICANT CHANGE UP (ref 10.3–14.5)
RH IG SCN BLD-IMP: NEGATIVE — SIGNIFICANT CHANGE UP
WBC # BLD: 7.26 K/UL — SIGNIFICANT CHANGE UP (ref 3.8–10.5)
WBC # FLD AUTO: 7.26 K/UL — SIGNIFICANT CHANGE UP (ref 3.8–10.5)

## 2024-11-27 PROCEDURE — 86077 PHYS BLOOD BANK SERV XMATCH: CPT

## 2024-11-27 RX ORDER — PRAMOXINE HYDROCHLORIDE 1 MG/ML
1 LOTION TOPICAL EVERY 4 HOURS
Refills: 0 | Status: DISCONTINUED | OUTPATIENT
Start: 2024-11-27 | End: 2024-11-29

## 2024-11-27 RX ORDER — 0.9 % SODIUM CHLORIDE 0.9 %
1000 INTRAVENOUS SOLUTION INTRAVENOUS
Refills: 0 | Status: DISCONTINUED | OUTPATIENT
Start: 2024-11-27 | End: 2024-11-28

## 2024-11-27 RX ORDER — CHLORHEXIDINE GLUCONATE 1.2 MG/ML
1 RINSE ORAL DAILY
Refills: 0 | Status: DISCONTINUED | OUTPATIENT
Start: 2024-11-27 | End: 2024-11-28

## 2024-11-27 RX ORDER — OXYCODONE HYDROCHLORIDE 30 MG/1
5 TABLET ORAL
Refills: 0 | Status: DISCONTINUED | OUTPATIENT
Start: 2024-11-27 | End: 2024-11-29

## 2024-11-27 RX ORDER — DIPHENHYDRAMINE HCL 25 MG
25 CAPSULE ORAL EVERY 6 HOURS
Refills: 0 | Status: DISCONTINUED | OUTPATIENT
Start: 2024-11-27 | End: 2024-11-29

## 2024-11-27 RX ORDER — .BETA.-CAROTENE, SODIUM ACETATE, ASCORBIC ACID, CHOLECALCIFEROL, .ALPHA.-TOCOPHEROL ACETATE, DL-, THIAMINE MONONITRATE, RIBOFLAVIN, NIACINAMIDE, PYRIDOXINE HYDROCHLORIDE, FOLIC ACID, CYANOCOBALAMIN, CALCIUM CARBONATE, FERROUS FUMARATE, ZINC OXIDE AND CUPRIC OXIDE 2000; 2000; 120; 400; 22; 1.84; 3; 20; 10; 1; 12; 200; 27; 25; 2 [IU]/1; [IU]/1; MG/1; [IU]/1; MG/1; MG/1; MG/1; MG/1; MG/1; MG/1; UG/1; MG/1; MG/1; MG/1; MG/1
1 TABLET ORAL DAILY
Refills: 0 | Status: DISCONTINUED | OUTPATIENT
Start: 2024-11-27 | End: 2024-11-29

## 2024-11-27 RX ORDER — DIBUCAINE 1 %
1 OINTMENT (GRAM) TOPICAL EVERY 6 HOURS
Refills: 0 | Status: DISCONTINUED | OUTPATIENT
Start: 2024-11-27 | End: 2024-11-29

## 2024-11-27 RX ORDER — TETANUS TOXOID, REDUCED DIPHTHERIA TOXOID AND ACELLULAR PERTUSSIS VACCINE, ADSORBED 5; 2.5; 8; 8; 2.5 [IU]/.5ML; [IU]/.5ML; UG/.5ML; UG/.5ML; UG/.5ML
0.5 SUSPENSION INTRAMUSCULAR ONCE
Refills: 0 | Status: DISCONTINUED | OUTPATIENT
Start: 2024-11-27 | End: 2024-11-29

## 2024-11-27 RX ORDER — BENZOCAINE 10 %
1 OINTMENT (GRAM) TOPICAL EVERY 6 HOURS
Refills: 0 | Status: DISCONTINUED | OUTPATIENT
Start: 2024-11-27 | End: 2024-11-29

## 2024-11-27 RX ORDER — SIMETHICONE 125 MG
80 CAPSULE ORAL EVERY 4 HOURS
Refills: 0 | Status: DISCONTINUED | OUTPATIENT
Start: 2024-11-27 | End: 2024-11-29

## 2024-11-27 RX ORDER — HYDROCORTISONE BUTYRATE 0.1 %
1 CREAM (GRAM) TOPICAL EVERY 6 HOURS
Refills: 0 | Status: DISCONTINUED | OUTPATIENT
Start: 2024-11-27 | End: 2024-11-29

## 2024-11-27 RX ORDER — IBUPROFEN 200 MG
600 TABLET ORAL EVERY 6 HOURS
Refills: 0 | Status: COMPLETED | OUTPATIENT
Start: 2024-11-27 | End: 2025-10-26

## 2024-11-27 RX ORDER — ACETAMINOPHEN 500MG 500 MG/1
975 TABLET, COATED ORAL
Refills: 0 | Status: DISCONTINUED | OUTPATIENT
Start: 2024-11-27 | End: 2024-11-29

## 2024-11-27 RX ORDER — OXYCODONE HYDROCHLORIDE 30 MG/1
5 TABLET ORAL ONCE
Refills: 0 | Status: DISCONTINUED | OUTPATIENT
Start: 2024-11-27 | End: 2024-11-29

## 2024-11-27 RX ORDER — SODIUM CHLORIDE 9 MG/ML
3 INJECTION, SOLUTION INTRAMUSCULAR; INTRAVENOUS; SUBCUTANEOUS EVERY 8 HOURS
Refills: 0 | Status: DISCONTINUED | OUTPATIENT
Start: 2024-11-27 | End: 2024-11-29

## 2024-11-27 RX ORDER — TRISODIUM CITRATE DIHYDRATE AND CITRIC ACID MONOHYDRATE 500; 334 MG/5ML; MG/5ML
15 SOLUTION ORAL EVERY 6 HOURS
Refills: 0 | Status: DISCONTINUED | OUTPATIENT
Start: 2024-11-27 | End: 2024-11-28

## 2024-11-27 RX ORDER — KETOROLAC TROMETHAMINE 30 MG/ML
30 INJECTION INTRAMUSCULAR; INTRAVENOUS ONCE
Refills: 0 | Status: DISCONTINUED | OUTPATIENT
Start: 2024-11-27 | End: 2024-11-27

## 2024-11-27 RX ORDER — WITCH HAZEL 50 G/100ML
1 SOLUTION RECTAL EVERY 4 HOURS
Refills: 0 | Status: DISCONTINUED | OUTPATIENT
Start: 2024-11-27 | End: 2024-11-29

## 2024-11-27 RX ORDER — LANOLIN 72 %
1 OINTMENT (GRAM) TOPICAL EVERY 6 HOURS
Refills: 0 | Status: DISCONTINUED | OUTPATIENT
Start: 2024-11-27 | End: 2024-11-29

## 2024-11-27 RX ADMIN — Medication 167 MILLIUNIT(S)/MIN: at 22:15

## 2024-11-27 RX ADMIN — Medication 10 UNIT(S): at 22:15

## 2024-11-27 RX ADMIN — Medication 4 MILLIUNIT(S)/MIN: at 18:18

## 2024-11-27 RX ADMIN — KETOROLAC TROMETHAMINE 30 MILLIGRAM(S): 30 INJECTION INTRAMUSCULAR; INTRAVENOUS at 23:13

## 2024-11-27 RX ADMIN — Medication 125 MILLILITER(S): at 17:54

## 2024-11-27 RX ADMIN — KETOROLAC TROMETHAMINE 30 MILLIGRAM(S): 30 INJECTION INTRAMUSCULAR; INTRAVENOUS at 23:27

## 2024-11-27 NOTE — OB RN PATIENT PROFILE - NS_PRENATALLABSOURCEGBSBACTPN_OBGYN_ALL_OB
Potassium and magnesium ordered d/t patient concern of low levels.     
Referred to psych for evaluation d/t excessive visits between multiple clinics staggered with bringing child in to clinic and ER for visits r/t vague complaints on days she does not go.   
unknown

## 2024-11-27 NOTE — OB RN DELIVERY SUMMARY - NSSELHIDDEN_OBGYN_ALL_OB_FT
[NS_DeliveryAttending1_OBGYN_ALL_OB_FT:[email or web address]=],[NS_DeliveryRN_OBGYN_ALL_OB_FT:[id number]=]

## 2024-11-27 NOTE — OB RN PATIENT PROFILE - FALL HARM RISK - UNIVERSAL INTERVENTIONS
Bed in lowest position, wheels locked, appropriate side rails in place/Call bell, personal items and telephone in reach/Instruct patient to call for assistance before getting out of bed or chair/Non-slip footwear when patient is out of bed/Effort to call system/Physically safe environment - no spills, clutter or unnecessary equipment/Purposeful Proactive Rounding/Room/bathroom lighting operational, light cord in reach

## 2024-11-27 NOTE — OB PROVIDER H&P - PROBLEM SELECTOR PLAN 1
- Admit to L & D/labs/IVF/clears  - Fetal status - NST/TOCO  - GBS neg  - Pain control - as needed  - Labor management- will start pitocin augmentation  - Consents to be signed  D/W  Dr Chinedu Bernard PA-C

## 2024-11-27 NOTE — OB PROVIDER H&P - HISTORY OF PRESENT ILLNESS
33yo      @  40w 1 d     presents for IOL  Denies contractions, VB or LOF has + FM      PNC: Dr Che  PNI: uncomplicated  s/p rhogam  PNL: GBS negative      All: No Known Allergies  Meds: PNV, iron, B12  PMHx: Denies  PSHx: Denies  Socialhx: Denies x 3, Denies anxiety or depression  OBhx:   FT   7lbs     FT   7lbs     FT   7lbs     FT   7lbs     FT   7lbs   GYNhx: Denies fibroids, ov cysts or STDs or abnormal pap smear    T(C): --  HR: 91 (24 @ 17:46) (81 - 95)  BP: 116/73 (24 @ 17:28) (116/73 - 116/73)  SpO2: 96% (24 @ 17:46) (96% - 99%)    Gen: NAD  Heart: RRR  Lungs: CTA B/L  Abdomen: Gravid, NT  Ext: no calf tenderness    NST: 150's moderate variabilty + accels no decels  TOCO: occasional  VE: 4/60/-3  EFW: 3400  BSUS: vtx

## 2024-11-27 NOTE — OB PROVIDER H&P - NSLOWPPHRISK_OBGYN_A_OB
No previous uterine incision/[place] Pregnancy/No known bleeding disorder/No history of postpartum hemorrhage

## 2024-11-27 NOTE — OB RN DELIVERY SUMMARY - NS_SEPSISRSKCALC_OBGYN_ALL_OB_FT
EOS calculated successfully. EOS Risk Factor: 0.5/1000 live births ([facility] national incidence); GA=40w1d; Temp=99.68; ROM=0.467; GBS='Negative'; Antibiotics='No antibiotics or any antibiotics < 2 hrs prior to birth'

## 2024-11-27 NOTE — OB RN PATIENT PROFILE - AS TEMP SITE
Attempted to initiate PT eval this date.  Pt adamantly refused PT services stating no needs at this time, alerted RN.  Will d/c PT orders   
oral

## 2024-11-27 NOTE — OB RN PATIENT PROFILE - NS_NPO_OBGYN_ALL_OB_DT
BP a little elevated today   He recently started riding his stationary bike again  SBP at home averaging mid 120s to 130s  I asked him to send me his BP log in 2 weeks for review  Continue Amlodipine 5 mg daily for now   Follow a low sodium diet   Lab work as ordered
Continue with stationary bike and walking dog regularly   Dietary modifications encouraged
Jocelyn Resendiz was on a statin a few years ago, stopped on his own because he "didn't like it"  He is aware of the risks of this   Updated lipid panel ordered  Continue ASA and Krill oil daily
Quit about 3 years ago (started smoking again for a few months about a year ago)  60 ppd year history   He has no interest in the [place] lung screening program
27-Nov-2024 14:00

## 2024-11-28 LAB — T PALLIDUM AB TITR SER: NEGATIVE — SIGNIFICANT CHANGE UP

## 2024-11-28 RX ORDER — IBUPROFEN 200 MG
600 TABLET ORAL EVERY 6 HOURS
Refills: 0 | Status: DISCONTINUED | OUTPATIENT
Start: 2024-11-28 | End: 2024-11-29

## 2024-11-28 RX ADMIN — Medication 600 MILLIGRAM(S): at 06:53

## 2024-11-28 RX ADMIN — Medication 600 MILLIGRAM(S): at 12:10

## 2024-11-28 RX ADMIN — Medication 600 MILLIGRAM(S): at 18:47

## 2024-11-28 RX ADMIN — ACETAMINOPHEN 500MG 975 MILLIGRAM(S): 500 TABLET, COATED ORAL at 03:06

## 2024-11-28 RX ADMIN — Medication 600 MILLIGRAM(S): at 23:26

## 2024-11-28 RX ADMIN — ACETAMINOPHEN 500MG 975 MILLIGRAM(S): 500 TABLET, COATED ORAL at 10:23

## 2024-11-28 RX ADMIN — ACETAMINOPHEN 500MG 975 MILLIGRAM(S): 500 TABLET, COATED ORAL at 21:25

## 2024-11-28 RX ADMIN — Medication 600 MILLIGRAM(S): at 05:53

## 2024-11-28 RX ADMIN — Medication 600 MILLIGRAM(S): at 18:17

## 2024-11-28 RX ADMIN — ACETAMINOPHEN 500MG 975 MILLIGRAM(S): 500 TABLET, COATED ORAL at 14:41

## 2024-11-28 RX ADMIN — ACETAMINOPHEN 500MG 975 MILLIGRAM(S): 500 TABLET, COATED ORAL at 20:25

## 2024-11-28 RX ADMIN — Medication 600 MILLIGRAM(S): at 12:40

## 2024-11-28 RX ADMIN — ACETAMINOPHEN 500MG 975 MILLIGRAM(S): 500 TABLET, COATED ORAL at 15:11

## 2024-11-28 RX ADMIN — ACETAMINOPHEN 500MG 975 MILLIGRAM(S): 500 TABLET, COATED ORAL at 02:06

## 2024-11-28 RX ADMIN — ACETAMINOPHEN 500MG 975 MILLIGRAM(S): 500 TABLET, COATED ORAL at 09:53

## 2024-11-28 NOTE — PROGRESS NOTE ADULT - SUBJECTIVE AND OBJECTIVE BOX
Postpartum Note- PPD#1    Allergies: No Known Allergies    Blood Type  O Negative  Rubella Immune  RPR Non Reactive    S:Patient is a  34y   PPD#1 S/P    Patient w/o complaints, pain is controlled.    Pt is OOB, tolerating PO, passing flatus. Lochia WNL.     Feeding: Breast    O:  Vital Signs Last 24 Hrs  T(C): 36.7 (2024 05:35), Max: 37.6 (2024 18:46)  T(F): 98.1 (2024 05:35), Max: 99.68 (2024 18:46)  HR: 62 (2024 05:35) (55 - 106)  BP: 98/62 (2024 05:35) (91/52 - 120/81)  BP(mean): 78 (2024 01:44) (78 - 78)  RR: 18 (2024 05:35) (17 - 20)  SpO2: 98% (2024 05:35) (83% - 100%)    Gen: NAD  Abdomen: Soft, nontender, non-distended, fundus firm.  Vaginal: Lochia WNL  Ext: Neg calf tenderness, neg edema    LABS:    Hemoglobin: 13.0 g/dL ( @ 18:06)      Hematocrit: 39.3 % ( @ 18:06)

## 2024-11-28 NOTE — PROGRESS NOTE ADULT - ASSESSMENT
A/P: 34y  PPD # 1 S/P   doing well    Current Issues: None    PAST MEDICAL & SURGICAL HISTORY:  No pertinent past medical history    No significant past surgical history

## 2024-11-29 ENCOUNTER — TRANSCRIPTION ENCOUNTER (OUTPATIENT)
Age: 34
End: 2024-11-29

## 2024-11-29 VITALS
HEART RATE: 56 BPM | RESPIRATION RATE: 18 BRPM | SYSTOLIC BLOOD PRESSURE: 94 MMHG | TEMPERATURE: 98 F | DIASTOLIC BLOOD PRESSURE: 61 MMHG | OXYGEN SATURATION: 97 %

## 2024-11-29 PROCEDURE — 85025 COMPLETE CBC W/AUTO DIFF WBC: CPT

## 2024-11-29 PROCEDURE — 86850 RBC ANTIBODY SCREEN: CPT

## 2024-11-29 PROCEDURE — 86703 HIV-1/HIV-2 1 RESULT ANTBDY: CPT

## 2024-11-29 PROCEDURE — 86870 RBC ANTIBODY IDENTIFICATION: CPT

## 2024-11-29 PROCEDURE — 59050 FETAL MONITOR W/REPORT: CPT

## 2024-11-29 PROCEDURE — 86901 BLOOD TYPING SEROLOGIC RH(D): CPT

## 2024-11-29 PROCEDURE — 86900 BLOOD TYPING SEROLOGIC ABO: CPT

## 2024-11-29 PROCEDURE — 86780 TREPONEMA PALLIDUM: CPT

## 2024-11-29 RX ADMIN — ACETAMINOPHEN 500MG 975 MILLIGRAM(S): 500 TABLET, COATED ORAL at 02:11

## 2024-11-29 RX ADMIN — Medication 600 MILLIGRAM(S): at 00:26

## 2024-11-29 RX ADMIN — Medication 600 MILLIGRAM(S): at 06:12

## 2024-11-29 RX ADMIN — ACETAMINOPHEN 500MG 975 MILLIGRAM(S): 500 TABLET, COATED ORAL at 09:23

## 2024-11-29 RX ADMIN — ACETAMINOPHEN 500MG 975 MILLIGRAM(S): 500 TABLET, COATED ORAL at 03:11

## 2024-11-29 RX ADMIN — ACETAMINOPHEN 500MG 975 MILLIGRAM(S): 500 TABLET, COATED ORAL at 09:59

## 2024-11-29 RX ADMIN — Medication 600 MILLIGRAM(S): at 05:14

## 2024-11-29 NOTE — DISCHARGE NOTE OB - CARE PROVIDER_API CALL
Meghan Che  Obstetrics and Gynecology  [address], [address]  [place] [address]  Phone: [phone]  Fax: [phone]  Follow Up Time:

## 2024-11-29 NOTE — DISCHARGE NOTE OB - PATIENT PORTAL LINK FT
You can access the FollowMyHealth Patient Portal offered by [facility] by registering at the following website: [email or web address]. By joining [employer]’s FollowMyHealth portal, you will also be able to view your health information using other applications (apps) compatible with our system.

## 2024-11-29 NOTE — DISCHARGE NOTE OB - FINANCIAL ASSISTANCE
[facility] provides services at a reduced cost to those who are determined to be eligible through [facility]’s financial assistance program. Information regarding [facility]’s financial assistance program can be found by going to [email or web address] or by calling [phone].
